# Patient Record
Sex: FEMALE | Race: WHITE | NOT HISPANIC OR LATINO
[De-identification: names, ages, dates, MRNs, and addresses within clinical notes are randomized per-mention and may not be internally consistent; named-entity substitution may affect disease eponyms.]

---

## 2017-01-11 ENCOUNTER — TRANSCRIPTION ENCOUNTER (OUTPATIENT)
Age: 42
End: 2017-01-11

## 2017-05-22 ENCOUNTER — RESULT REVIEW (OUTPATIENT)
Age: 42
End: 2017-05-22

## 2017-07-12 ENCOUNTER — RESULT REVIEW (OUTPATIENT)
Age: 42
End: 2017-07-12

## 2017-08-21 ENCOUNTER — TRANSCRIPTION ENCOUNTER (OUTPATIENT)
Age: 42
End: 2017-08-21

## 2018-10-09 ENCOUNTER — RESULT REVIEW (OUTPATIENT)
Age: 43
End: 2018-10-09

## 2018-11-17 ENCOUNTER — TRANSCRIPTION ENCOUNTER (OUTPATIENT)
Age: 43
End: 2018-11-17

## 2019-07-22 ENCOUNTER — TRANSCRIPTION ENCOUNTER (OUTPATIENT)
Age: 44
End: 2019-07-22

## 2019-10-30 ENCOUNTER — RESULT REVIEW (OUTPATIENT)
Age: 44
End: 2019-10-30

## 2020-01-08 ENCOUNTER — APPOINTMENT (OUTPATIENT)
Dept: COLORECTAL SURGERY | Facility: CLINIC | Age: 45
End: 2020-01-08
Payer: COMMERCIAL

## 2020-01-08 VITALS
HEIGHT: 64 IN | DIASTOLIC BLOOD PRESSURE: 60 MMHG | HEART RATE: 62 BPM | BODY MASS INDEX: 18.1 KG/M2 | RESPIRATION RATE: 12 BRPM | WEIGHT: 106 LBS | SYSTOLIC BLOOD PRESSURE: 100 MMHG

## 2020-01-08 DIAGNOSIS — K58.2 MIXED IRRITABLE BOWEL SYNDROME: ICD-10-CM

## 2020-01-08 DIAGNOSIS — Z12.11 ENCOUNTER FOR SCREENING FOR MALIGNANT NEOPLASM OF COLON: ICD-10-CM

## 2020-01-08 DIAGNOSIS — Z72.89 OTHER PROBLEMS RELATED TO LIFESTYLE: ICD-10-CM

## 2020-01-08 DIAGNOSIS — K61.0 ANAL ABSCESS: ICD-10-CM

## 2020-01-08 DIAGNOSIS — Z83.79 FAMILY HISTORY OF OTHER DISEASES OF THE DIGESTIVE SYSTEM: ICD-10-CM

## 2020-01-08 PROCEDURE — 99203 OFFICE O/P NEW LOW 30 MIN: CPT

## 2020-01-08 NOTE — PHYSICAL EXAM
[Normal Breath Sounds] : Normal breath sounds [Normal Heart Sounds] : normal heart sounds [No Edema] : No edema [Normal Rate and Rhythm] : normal rate and rhythm [Alert] : alert [Oriented to Person] : oriented to person [Oriented to Place] : oriented to place [Oriented to Time] : oriented to time [Calm] : calm [de-identified] : flat +BS NT/ND [de-identified] : NC/AT [de-identified] : +ROM [de-identified] : intact

## 2020-01-08 NOTE — REVIEW OF SYSTEMS
[As Noted in HPI] : as noted in HPI [Negative] : Endocrine [Shortness Of Breath] : no shortness of breath [Chest Pain] : no chest pain [Easy Bleeding] : no tendency for easy bleeding [Easy Bruising] : no tendency for easy bruising

## 2020-01-08 NOTE — HISTORY OF PRESENT ILLNESS
[FreeTextEntry1] : 43yo WFw/hx IBS.  S/P I&D of deep postanal space abscess in 10/2008 (no sequelae).\par \par Pt presents for screening colonoscopy.  Hx diarrhea alt w/constipation.  Occ blood on toilet tissue.  Wt stable.  Denies abdominal pain.  \par \par Father +hx colitis.

## 2020-01-13 ENCOUNTER — APPOINTMENT (OUTPATIENT)
Dept: COLORECTAL SURGERY | Facility: CLINIC | Age: 45
End: 2020-01-13
Payer: COMMERCIAL

## 2020-01-13 PROCEDURE — 45378 DIAGNOSTIC COLONOSCOPY: CPT

## 2020-11-03 ENCOUNTER — RESULT REVIEW (OUTPATIENT)
Age: 45
End: 2020-11-03

## 2020-12-23 PROBLEM — Z12.11 ENCOUNTER FOR SCREENING COLONOSCOPY: Status: RESOLVED | Noted: 2020-01-08 | Resolved: 2020-12-23

## 2021-02-23 ENCOUNTER — TRANSCRIPTION ENCOUNTER (OUTPATIENT)
Age: 46
End: 2021-02-23

## 2021-05-18 ENCOUNTER — APPOINTMENT (OUTPATIENT)
Dept: OBGYN | Facility: CLINIC | Age: 46
End: 2021-05-18
Payer: COMMERCIAL

## 2021-05-18 VITALS — SYSTOLIC BLOOD PRESSURE: 100 MMHG | DIASTOLIC BLOOD PRESSURE: 80 MMHG

## 2021-05-18 DIAGNOSIS — N64.4 MASTODYNIA: ICD-10-CM

## 2021-05-18 LAB — HCG UR QL: NEGATIVE

## 2021-05-18 PROCEDURE — 81025 URINE PREGNANCY TEST: CPT

## 2021-05-18 PROCEDURE — 99072 ADDL SUPL MATRL&STAF TM PHE: CPT

## 2021-05-18 PROCEDURE — 99214 OFFICE O/P EST MOD 30 MIN: CPT | Mod: 25

## 2021-06-08 ENCOUNTER — ASOB RESULT (OUTPATIENT)
Age: 46
End: 2021-06-08

## 2021-06-08 ENCOUNTER — APPOINTMENT (OUTPATIENT)
Dept: OBGYN | Facility: CLINIC | Age: 46
End: 2021-06-08
Payer: COMMERCIAL

## 2021-06-08 PROCEDURE — 99072 ADDL SUPL MATRL&STAF TM PHE: CPT

## 2021-06-08 PROCEDURE — 76830 TRANSVAGINAL US NON-OB: CPT

## 2021-07-08 ENCOUNTER — APPOINTMENT (OUTPATIENT)
Dept: OBGYN | Facility: CLINIC | Age: 46
End: 2021-07-08

## 2021-08-03 ENCOUNTER — APPOINTMENT (OUTPATIENT)
Dept: OBGYN | Facility: CLINIC | Age: 46
End: 2021-08-03

## 2021-08-30 ENCOUNTER — TRANSCRIPTION ENCOUNTER (OUTPATIENT)
Age: 46
End: 2021-08-30

## 2021-11-23 DIAGNOSIS — R92.2 INCONCLUSIVE MAMMOGRAM: ICD-10-CM

## 2022-01-05 ENCOUNTER — RESULT REVIEW (OUTPATIENT)
Age: 47
End: 2022-01-05

## 2022-01-05 ENCOUNTER — APPOINTMENT (OUTPATIENT)
Dept: MAMMOGRAPHY | Facility: CLINIC | Age: 47
End: 2022-01-05
Payer: COMMERCIAL

## 2022-01-05 ENCOUNTER — APPOINTMENT (OUTPATIENT)
Dept: ULTRASOUND IMAGING | Facility: CLINIC | Age: 47
End: 2022-01-05
Payer: COMMERCIAL

## 2022-01-05 PROCEDURE — 76641 ULTRASOUND BREAST COMPLETE: CPT | Mod: 50

## 2022-01-05 PROCEDURE — 77063 BREAST TOMOSYNTHESIS BI: CPT

## 2022-01-05 PROCEDURE — 77067 SCR MAMMO BI INCL CAD: CPT

## 2022-01-20 ENCOUNTER — APPOINTMENT (OUTPATIENT)
Dept: OBGYN | Facility: CLINIC | Age: 47
End: 2022-01-20
Payer: COMMERCIAL

## 2022-01-20 VITALS — SYSTOLIC BLOOD PRESSURE: 112 MMHG | DIASTOLIC BLOOD PRESSURE: 67 MMHG | HEIGHT: 64 IN

## 2022-01-20 DIAGNOSIS — N83.201 UNSPECIFIED OVARIAN CYST, RIGHT SIDE: ICD-10-CM

## 2022-01-20 DIAGNOSIS — N83.202 UNSPECIFIED OVARIAN CYST, RIGHT SIDE: ICD-10-CM

## 2022-01-20 DIAGNOSIS — Z00.00 ENCOUNTER FOR GENERAL ADULT MEDICAL EXAMINATION W/OUT ABNORMAL FINDINGS: ICD-10-CM

## 2022-01-20 PROCEDURE — 99396 PREV VISIT EST AGE 40-64: CPT

## 2022-01-20 PROCEDURE — 82270 OCCULT BLOOD FECES: CPT

## 2022-01-20 NOTE — PLAN
[FreeTextEntry1] : 46 year old female pt presents for routine gyn exam\par Breast and pelvic exam performed\par Pap/HPV conducted\par Advised pt to schedule pelvic sono \par Advised pt to schedule DEXA\par \par f/u with Derm\par RTO in 1 year or PRN\par

## 2022-01-20 NOTE — HISTORY OF PRESENT ILLNESS
[FreeTextEntry1] : 2022. CHASTITY VALENTINO 46 year old female  LMP . She presents for an annual gyn exam and offers no complaints. \par \par Periods Q1-6 months Denies breakthrough bleeding, vaginal discharge and vaginitis sxs. Denies abdominal or pelvic pain. She has normal BMs. Denies bloody stool and urinary complaints. She is currently sexually active. \par \par Denies FHx of breast, ovarian, uterine or colon cancer. \par No new medical conditions, medications or surgeries.\par \par PHx; Sarcoidosis \par SHx: Tonsillectomy \par Med: denies\par All: Sulfa \par \par \par \par  [TextBox_4] : Dec 2020 - cyst 4 cm right, 1.7 cm left \par June 2021 - 2.7 cm cyst right, left normal  [Mammogramdate] : 2021 [BreastSonogramDate] : 2021 [PapSmeardate] : 2020 [ColonoscopyDate] : 2020

## 2022-01-20 NOTE — COUNSELING
[Vitamins/Supplements] : vitamins/supplements [Breast Self Exam] : breast self exam [Confidentiality] : confidentiality

## 2022-01-20 NOTE — PHYSICAL EXAM

## 2022-01-23 LAB — HPV HIGH+LOW RISK DNA PNL CVX: NOT DETECTED

## 2022-01-25 LAB — CYTOLOGY CVX/VAG DOC THIN PREP: NORMAL

## 2022-10-17 ENCOUNTER — APPOINTMENT (OUTPATIENT)
Dept: OBGYN | Facility: CLINIC | Age: 47
End: 2022-10-17

## 2022-10-17 ENCOUNTER — ASOB RESULT (OUTPATIENT)
Age: 47
End: 2022-10-17

## 2022-10-17 VITALS
WEIGHT: 107 LBS | HEIGHT: 64 IN | SYSTOLIC BLOOD PRESSURE: 115 MMHG | BODY MASS INDEX: 18.27 KG/M2 | DIASTOLIC BLOOD PRESSURE: 73 MMHG

## 2022-10-17 LAB
APPEARANCE: CLEAR
BACTERIA: NEGATIVE
BILIRUBIN URINE: NEGATIVE
BLOOD URINE: NEGATIVE
COLOR: YELLOW
GLUCOSE QUALITATIVE U: NEGATIVE
HCG UR QL: NEGATIVE
HYALINE CASTS: 2 /LPF
KETONES URINE: NEGATIVE
LEUKOCYTE ESTERASE URINE: NEGATIVE
MICROSCOPIC-UA: NORMAL
NITRITE URINE: NEGATIVE
PH URINE: 6.5
PROTEIN URINE: NEGATIVE
RED BLOOD CELLS URINE: 1 /HPF
SPECIFIC GRAVITY URINE: 1.01
SQUAMOUS EPITHELIAL CELLS: 1 /HPF
UROBILINOGEN URINE: NORMAL
WHITE BLOOD CELLS URINE: 0 /HPF

## 2022-10-17 PROCEDURE — 99213 OFFICE O/P EST LOW 20 MIN: CPT

## 2022-10-17 PROCEDURE — 76830 TRANSVAGINAL US NON-OB: CPT

## 2022-10-17 RX ORDER — BETAMETHASONE DIPROPIONATE 0.5 MG/G
0.05 CREAM, AUGMENTED TOPICAL
Qty: 100 | Refills: 0 | Status: ACTIVE | COMMUNITY
Start: 2022-07-18

## 2022-10-18 ENCOUNTER — NON-APPOINTMENT (OUTPATIENT)
Age: 47
End: 2022-10-18

## 2022-10-18 LAB — BACTERIA UR CULT: NORMAL

## 2022-11-09 ENCOUNTER — APPOINTMENT (OUTPATIENT)
Dept: OBGYN | Facility: CLINIC | Age: 47
End: 2022-11-09

## 2022-11-11 ENCOUNTER — APPOINTMENT (OUTPATIENT)
Dept: OBGYN | Facility: CLINIC | Age: 47
End: 2022-11-11

## 2022-11-11 ENCOUNTER — ASOB RESULT (OUTPATIENT)
Age: 47
End: 2022-11-11

## 2022-11-11 PROCEDURE — 81025 URINE PREGNANCY TEST: CPT

## 2022-11-11 PROCEDURE — 76831 ECHO EXAM UTERUS: CPT

## 2022-11-11 PROCEDURE — 58340 CATHETER FOR HYSTEROGRAPHY: CPT

## 2022-11-14 LAB — HCG UR QL: NEGATIVE

## 2022-12-13 ENCOUNTER — NON-APPOINTMENT (OUTPATIENT)
Age: 47
End: 2022-12-13

## 2023-01-09 DIAGNOSIS — R92.2 INCONCLUSIVE MAMMOGRAM: ICD-10-CM

## 2023-01-09 DIAGNOSIS — Z12.39 ENCOUNTER FOR OTHER SCREENING FOR MALIGNANT NEOPLASM OF BREAST: ICD-10-CM

## 2023-01-11 ENCOUNTER — APPOINTMENT (OUTPATIENT)
Dept: MAMMOGRAPHY | Facility: CLINIC | Age: 48
End: 2023-01-11
Payer: COMMERCIAL

## 2023-01-11 ENCOUNTER — TRANSCRIPTION ENCOUNTER (OUTPATIENT)
Age: 48
End: 2023-01-11

## 2023-01-11 ENCOUNTER — RESULT REVIEW (OUTPATIENT)
Age: 48
End: 2023-01-11

## 2023-01-11 ENCOUNTER — APPOINTMENT (OUTPATIENT)
Dept: ULTRASOUND IMAGING | Facility: CLINIC | Age: 48
End: 2023-01-11
Payer: COMMERCIAL

## 2023-01-11 PROCEDURE — 76641 ULTRASOUND BREAST COMPLETE: CPT | Mod: 50

## 2023-01-11 PROCEDURE — 77063 BREAST TOMOSYNTHESIS BI: CPT

## 2023-01-11 PROCEDURE — 77067 SCR MAMMO BI INCL CAD: CPT

## 2023-01-19 ENCOUNTER — RESULT REVIEW (OUTPATIENT)
Age: 48
End: 2023-01-19

## 2023-01-19 ENCOUNTER — APPOINTMENT (OUTPATIENT)
Dept: MAMMOGRAPHY | Facility: CLINIC | Age: 48
End: 2023-01-19
Payer: COMMERCIAL

## 2023-01-19 ENCOUNTER — APPOINTMENT (OUTPATIENT)
Dept: ULTRASOUND IMAGING | Facility: CLINIC | Age: 48
End: 2023-01-19

## 2023-01-19 PROCEDURE — G0279: CPT | Mod: RT

## 2023-01-19 PROCEDURE — 77065 DX MAMMO INCL CAD UNI: CPT | Mod: RT

## 2023-01-23 ENCOUNTER — APPOINTMENT (OUTPATIENT)
Dept: OBGYN | Facility: CLINIC | Age: 48
End: 2023-01-23
Payer: COMMERCIAL

## 2023-01-23 VITALS
DIASTOLIC BLOOD PRESSURE: 74 MMHG | SYSTOLIC BLOOD PRESSURE: 120 MMHG | WEIGHT: 107 LBS | HEIGHT: 64 IN | BODY MASS INDEX: 18.27 KG/M2 | HEART RATE: 65 BPM

## 2023-01-23 PROCEDURE — 99396 PREV VISIT EST AGE 40-64: CPT

## 2023-01-23 PROCEDURE — 82270 OCCULT BLOOD FECES: CPT

## 2023-01-23 RX ORDER — DICLOFENAC SODIUM AND MISOPROSTOL 50; 200 MG/1; UG/1
50-0.2 TABLET, DELAYED RELEASE ORAL
Qty: 60 | Refills: 0 | Status: DISCONTINUED | COMMUNITY
Start: 2022-10-12 | End: 2023-01-23

## 2023-01-24 LAB — HPV HIGH+LOW RISK DNA PNL CVX: NOT DETECTED

## 2023-01-26 LAB — CYTOLOGY CVX/VAG DOC THIN PREP: NORMAL

## 2023-02-08 ENCOUNTER — APPOINTMENT (OUTPATIENT)
Dept: OBGYN | Facility: CLINIC | Age: 48
End: 2023-02-08
Payer: COMMERCIAL

## 2023-02-08 ENCOUNTER — ASOB RESULT (OUTPATIENT)
Age: 48
End: 2023-02-08

## 2023-02-08 DIAGNOSIS — N95.1 MENOPAUSAL AND FEMALE CLIMACTERIC STATES: ICD-10-CM

## 2023-02-08 DIAGNOSIS — N93.8 OTHER SPECIFIED ABNORMAL UTERINE AND VAGINAL BLEEDING: ICD-10-CM

## 2023-02-08 LAB
HCG UR QL: NEGATIVE
QUALITY CONTROL: YES

## 2023-02-08 PROCEDURE — 58340 CATHETER FOR HYSTEROGRAPHY: CPT

## 2023-02-08 PROCEDURE — 76831 ECHO EXAM UTERUS: CPT

## 2023-02-08 PROCEDURE — 81025 URINE PREGNANCY TEST: CPT

## 2023-02-08 PROCEDURE — 99212 OFFICE O/P EST SF 10 MIN: CPT | Mod: 25

## 2023-02-08 NOTE — PLAN
[FreeTextEntry1] : Sonohysterogram conducted for perimenopausal abnl bleeding\par SHG showed no polyps\par Sonohysterogram performed\par Pt tolerated procedure well and was given instructions prior to discharge\par \par  \par Menopausal sxs. D/w pt Relizen Samples given. Reviewed R/B/A.  Option sof HRT vs Effexor

## 2023-02-08 NOTE — PROCEDURE
[Abnormal Uterine Bleeding] : abnormal uterine bleeding [Saline Infusion Sonography] : saline infusion sonography [No Fibroid(s)] : no fibroid(s) [FreeTextEntry3] : 02/08/2023. CHASTITY VALENTINO 47 year year old female presents for a sonohysterogram due to perimenopausal abnl bleeding\par Discussed risk of bleeding or infection\par Under sterile technique\par A speculum was placed in the vagina and the Cervix was identified and prepped with [place]ne\par SHG catheter was threaded through the external os until endometrial location was felt\par The balloon was then inflated with .5 cc of saline\par The transvaginal probe was then inserted into the vagina by the tech\par The catheter and balloon was localized\par Saline was gently instilled while the imaging was performed\par The endometrial cavity was fully visualized\par Findings: no polyps noted\par Plan: Advised to use Relizen\par Pt tolerated procedure well and was given instructions prior to discharge  [FreeTextEntry4] : Findings: no polyps noted\par Plan: Advised to use Relizen

## 2023-07-10 ENCOUNTER — LABORATORY RESULT (OUTPATIENT)
Age: 48
End: 2023-07-10

## 2023-07-10 ENCOUNTER — APPOINTMENT (OUTPATIENT)
Dept: RHEUMATOLOGY | Facility: CLINIC | Age: 48
End: 2023-07-10
Payer: COMMERCIAL

## 2023-07-10 VITALS
HEART RATE: 56 BPM | HEIGHT: 64 IN | SYSTOLIC BLOOD PRESSURE: 117 MMHG | DIASTOLIC BLOOD PRESSURE: 73 MMHG | BODY MASS INDEX: 17.93 KG/M2 | WEIGHT: 105 LBS | OXYGEN SATURATION: 98 %

## 2023-07-10 DIAGNOSIS — L92.0 GRANULOMA ANNULARE: ICD-10-CM

## 2023-07-10 PROCEDURE — 99203 OFFICE O/P NEW LOW 30 MIN: CPT

## 2023-07-10 NOTE — ASSESSMENT
[FreeTextEntry1] : 47 year old female here for evaluation of possible autoimmune disease.\par \par 1 She has occasional arthralgias and dyspnea but noticing more skin lesions throughout her body that have previously been diagnosed as either sarcoid lesions or granuloma annulare. Initially seemed to have responded to Plaquenil but more recently tried topical therapy and intralesional steroid injections without much relief. Referred to dermatology (Dr. Zapata) for evaluation and labs ordered today to evaluate for underlying systemic autoimmune disease which I doubt at this time. \par \par Follow up as needed based on labs and dermatology evaluation.

## 2023-07-10 NOTE — PHYSICAL EXAM
[General Appearance - Alert] : alert [General Appearance - In No Acute Distress] : in no acute distress [Sclera] : the sclera and conjunctiva were normal [PERRL With Normal Accommodation] : pupils were equal in size, round, and reactive to light [Extraocular Movements] : extraocular movements were intact [Outer Ear] : the ears and nose were normal in appearance [Oropharynx] : the oropharynx was normal [Neck Appearance] : the appearance of the neck was normal [Neck Cervical Mass (___cm)] : no neck mass was observed [Jugular Venous Distention Increased] : there was no jugular-venous distention [Thyroid Diffuse Enlargement] : the thyroid was not enlarged [Thyroid Nodule] : there were no palpable thyroid nodules [] : no respiratory distress [Auscultation Breath Sounds / Voice Sounds] : lungs were clear to auscultation bilaterally [Heart Rate And Rhythm] : heart rate was normal and rhythm regular [Heart Sounds] : normal S1 and S2 [Heart Sounds Gallop] : no gallops [Murmurs] : no murmurs [Heart Sounds Pericardial Friction Rub] : no pericardial rub [Full Pulse] : the pedal pulses are present [Edema] : there was no peripheral edema [Cervical Lymph Nodes Enlarged Posterior Bilaterally] : posterior cervical [Cervical Lymph Nodes Enlarged Anterior Bilaterally] : anterior cervical [Supraclavicular Lymph Nodes Enlarged Bilaterally] : supraclavicular [Axillary Lymph Nodes Enlarged Bilaterally] : axillary [No CVA Tenderness] : no ~M costovertebral angle tenderness [No Spinal Tenderness] : no spinal tenderness [Nail Clubbing] : no clubbing  or cyanosis of the fingernails [Abnormal Walk] : normal gait [Musculoskeletal - Swelling] : no joint swelling seen [Motor Tone] : muscle strength and tone were normal [FreeTextEntry1] : scattered maculopapular rashes over the limbs and torso [No Focal Deficits] : no focal deficits [Oriented To Time, Place, And Person] : oriented to person, place, and time [Impaired Insight] : insight and judgment were intact [Affect] : the affect was normal

## 2023-07-10 NOTE — REVIEW OF SYSTEMS
[Arthralgias] : arthralgias [Joint Pain] : no joint pain [Joint Swelling] : no joint swelling [Joint Stiffness] : no joint stiffness [Limb Pain] : no limb pain [Limb Swelling] : no limb swelling [As Noted in HPI] : as noted in HPI [Skin Lesions] : skin lesion [Negative] : Heme/Lymph

## 2023-07-10 NOTE — HISTORY OF PRESENT ILLNESS
[Currently Experiencing] : currently [Fatigue] : fatigue [Skin Lesions] : skin lesions [Arthralgias] : arthralgias [Dyspnea] : dyspnea [FreeTextEntry1] : 47 year old female here for evaluation of autoimmune condition\par \par Years ago she had a small lesion on her shin which was diagnosed as sarcoid lesion. She had an extensive evaluation for other organ manifestations which was negative. Was started on Plaquenil for a couple years which resolved her lesions. 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 THis time she was not treated with any medications. Tried topical treatments which did not help. Has been getting intralesional injections which is not working. Thinks that it has spread to all over her body since she got the COVID vaccine. Not itchy or painful but bothers her cosmetically. \par \par She is a dancer and always exercising so does notice occasional arthralgia and myalgias. Feels that her breathing is a bit compromised lately. \par \par Denies any constitutional symptoms - fevers, chills, malaise, weight loss, myalgias. No h/o rashes, uveitis, hair loss, headaches, mucosal ulcers, Raynaud's, sicca symptoms, GI or  issues, serositis, pleuritis, pericarditis, weakness or paresthesias. No h/o blood clots.\par \par A0 - healthy pregnancies. [Anorexia] : no anorexia [Weight Loss] : no weight loss [Malaise] : no malaise [Fever] : no fever [Chills] : no chills [Depression] : no depression [Malar Facial Rash] : no malar facial rash [Skin Nodules] : no skin nodules [Oral Ulcers] : no oral ulcers [Cough] : no cough [Dry Mouth] : no dry mouth [Dysphonia] : no dysphonia [Dysphagia] : no dysphagia [Shortness of Breath] : no shortness of breath [Chest Pain] : no chest pain [Joint Swelling] : no joint swelling [Joint Warmth] : no joint warmth [Joint Deformity] : no joint deformity [Decreased ROM] : no decreased range of motion [Morning Stiffness] : no morning stiffness [Falls] : no falls [Difficulty Standing] : no difficulty standing [Difficulty Walking] : no difficulty walking [Myalgias] : no myalgias [Muscle Weakness] : no muscle weakness [Muscle Spasms] : no muscle spasms [Muscle Cramping] : no muscle cramping [Visual Changes] : no visual changes [Eye Pain] : no eye pain [Eye Redness] : no eye redness [Dry Eyes] : no dry eyes

## 2023-07-14 LAB
24R-OH-CALCIDIOL SERPL-MCNC: 92 PG/ML
25(OH)D3 SERPL-MCNC: 37.2 NG/ML
ACE BLD-CCNC: 54 U/L
ALBUMIN MFR SERPL ELPH: 64.8 %
ALBUMIN SERPL ELPH-MCNC: 4.5 G/DL
ALBUMIN SERPL-MCNC: 4.3 G/DL
ALBUMIN/GLOB SERPL: 1.8 RATIO
ALP BLD-CCNC: 60 U/L
ALPHA1 GLOB MFR SERPL ELPH: 3.5 %
ALPHA1 GLOB SERPL ELPH-MCNC: 0.2 G/DL
ALPHA2 GLOB MFR SERPL ELPH: 7.2 %
ALPHA2 GLOB SERPL ELPH-MCNC: 0.5 G/DL
ALT SERPL-CCNC: 15 U/L
ANA SER IF-ACNC: NEGATIVE
ANION GAP SERPL CALC-SCNC: 12 MMOL/L
APPEARANCE: CLEAR
AST SERPL-CCNC: 21 U/L
B-GLOBULIN MFR SERPL ELPH: 9.1 %
B-GLOBULIN SERPL ELPH-MCNC: 0.6 G/DL
BILIRUB SERPL-MCNC: 0.7 MG/DL
BILIRUBIN URINE: NEGATIVE
BLOOD URINE: ABNORMAL
BUN SERPL-MCNC: 14 MG/DL
C3 SERPL-MCNC: 77 MG/DL
C4 SERPL-MCNC: 19 MG/DL
CALCIUM SERPL-MCNC: 9.7 MG/DL
CCP AB SER IA-ACNC: <8 UNITS
CHLORIDE SERPL-SCNC: 103 MMOL/L
CK SERPL-CCNC: 69 U/L
CO2 SERPL-SCNC: 25 MMOL/L
COLOR: YELLOW
CREAT SERPL-MCNC: 0.71 MG/DL
CRP SERPL-MCNC: <3 MG/L
DEPRECATED KAPPA LC FREE/LAMBDA SER: 1.27 RATIO
DSDNA AB SER-ACNC: <12 IU/ML
EGFR: 104 ML/MIN/1.73M2
ENA RNP AB SER IA-ACNC: <0.2 AL
ENA SM AB SER IA-ACNC: <0.2 AL
ENA SS-A AB SER IA-ACNC: <0.2 AL
ENA SS-B AB SER IA-ACNC: <0.2 AL
ERYTHROCYTE [SEDIMENTATION RATE] IN BLOOD BY WESTERGREN METHOD: 2 MM/HR
GAMMA GLOB FLD ELPH-MCNC: 1 G/DL
GAMMA GLOB MFR SERPL ELPH: 15.4 %
GLUCOSE QUALITATIVE U: NEGATIVE MG/DL
GLUCOSE SERPL-MCNC: 92 MG/DL
IGA SER QL IEP: 138 MG/DL
IGG SER QL IEP: 912 MG/DL
IGM SER QL IEP: 165 MG/DL
INTERPRETATION SERPL IEP-IMP: NORMAL
KAPPA LC CSF-MCNC: 1.38 MG/DL
KAPPA LC SERPL-MCNC: 1.75 MG/DL
KETONES URINE: NEGATIVE MG/DL
LEUKOCYTE ESTERASE URINE: NEGATIVE
NITRITE URINE: NEGATIVE
PH URINE: 7
POTASSIUM SERPL-SCNC: 4.2 MMOL/L
PROT SERPL-MCNC: 6.7 G/DL
PROTEIN URINE: NEGATIVE MG/DL
RF+CCP IGG SER-IMP: NEGATIVE
RHEUMATOID FACT SER QL: <10 IU/ML
SODIUM SERPL-SCNC: 140 MMOL/L
SPECIFIC GRAVITY URINE: 1.01
THYROGLOB AB SERPL-ACNC: <20 IU/ML
THYROPEROXIDASE AB SERPL IA-ACNC: 21 IU/ML
UROBILINOGEN URINE: 0.2 MG/DL

## 2023-07-17 LAB
ALBUPE: 17.7 %
ALPHA1UPE: 27.7 %
ALPHA2UPE: 21.6 %
BENCE JONES EXCRETION: 0 MG/24HR
BETAUPE: 12.3 %
CREAT 24H UR-MCNC: 0.6 G/24 H
CREATININE UR (MAYO): 57 MG/DL
GAMMAUPE: 20.7 %
IGA 24H UR QL IFE: NORMAL
KAPPA LC 24H UR QL: 0 MG/DL
M PROTEIN 24H MFR UR ELPH: 0 %
PROT ?TM UR-MCNC: 24 HR
PROT PATTERN 24H UR ELPH-IMP: NORMAL
PROT UR-MCNC: 5 MG/DL
PROT UR-MCNC: 5 MG/DL
SPECIMEN VOL 24H UR: 1075 ML
U PROTEIN QNT CALCULATION: 54 MG/24 H

## 2023-07-25 ENCOUNTER — APPOINTMENT (OUTPATIENT)
Dept: DERMATOLOGY | Facility: CLINIC | Age: 48
End: 2023-07-25
Payer: COMMERCIAL

## 2023-07-25 VITALS — HEIGHT: 64 IN | WEIGHT: 106 LBS | BODY MASS INDEX: 18.1 KG/M2

## 2023-07-25 PROCEDURE — 99204 OFFICE O/P NEW MOD 45 MIN: CPT

## 2023-07-26 ENCOUNTER — NON-APPOINTMENT (OUTPATIENT)
Age: 48
End: 2023-07-26

## 2023-07-30 NOTE — ASSESSMENT
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 Patient provided with contact information.  - After biopsy report received, plan to re-start Hydroxychloroquine 200 mg BID PO daily.  - Risks/benefits/alternatives of Plaquenil discussed. Discussed that it takes at least 3 months results to be seen.  - We have discussed the possible adverse effects of this medication, including but not limited to GI upset, low blood counts, inflammation of the liver, retinopathies, and hypersensitivity reaction.  - We have discussed that alcohol consumption must be eliminated while on this medication. we have discussed that pregnancy is non-viable result in significant fetal abnormality while on this medication and 3 months after completing course, and that the Patient must take adequate measures to eliminate risk of conceiving or participation in conceiving while on this medication. - Patient expressed understanding of these risks.  #high risk med mgmt (HCQ) - optho exam for baseline test

## 2023-07-30 NOTE — HISTORY OF PRESENT ILLNESS
[FreeTextEntry1] : NPA- rash [de-identified] : 47yoF presenting today for evaluation of a rash that started at least 10 years ago. Asx. Previously just on extremities but spread to trunk. History of reported previously been diagnosed as either sarcoid lesions or granuloma annulare. Initially seemed to have responded to Plaquenil x 2 years, but more recently tried UV therapy, topical therapy and intralesional steroid injections with (previously saw Dr. Osman Davis in [place]) without much relief. However, she does report relief with topical Opzelura overnight under occlusion. Previously saw Dr. Holden Muhammad in [place]. \par Saw Rheumatology (Dr. Woodard)- C3 slightly low at 77- but all labs WNL. \par Last skin bx was before COVID.\par \par Denies personal or family history of skin cancer. \par \par

## 2023-07-30 NOTE — PHYSICAL EXAM
[Full Body Skin Exam Performed] : performed [FreeTextEntry3] : General:\par well appearing person in nad, alert, pleasant;\par Skin:\par \par Face clear. \par Pink and white shiny granulomatous papules on the trunk and extremities, with one papule at left postauricular scalp. Several papules coalescing into plaques on the right upper arm and left upper back\par Hyperpigmented and hypopigmented macules and papules on bilateral lower extremities\par \par Unable to visualize nails d/t polish.\par No periungual hemorrhages.

## 2023-08-10 ENCOUNTER — APPOINTMENT (OUTPATIENT)
Dept: RADIOLOGY | Facility: IMAGING CENTER | Age: 48
End: 2023-08-10
Payer: COMMERCIAL

## 2023-08-10 ENCOUNTER — OUTPATIENT (OUTPATIENT)
Dept: OUTPATIENT SERVICES | Facility: HOSPITAL | Age: 48
LOS: 1 days | End: 2023-08-10
Payer: COMMERCIAL

## 2023-08-10 DIAGNOSIS — L30.9 DERMATITIS, UNSPECIFIED: ICD-10-CM

## 2023-08-10 PROCEDURE — 71046 X-RAY EXAM CHEST 2 VIEWS: CPT | Mod: 26

## 2023-08-10 PROCEDURE — 71046 X-RAY EXAM CHEST 2 VIEWS: CPT

## 2023-08-11 ENCOUNTER — NON-APPOINTMENT (OUTPATIENT)
Age: 48
End: 2023-08-11

## 2023-09-11 ENCOUNTER — APPOINTMENT (OUTPATIENT)
Dept: OBGYN | Facility: CLINIC | Age: 48
End: 2023-09-11
Payer: COMMERCIAL

## 2023-09-11 ENCOUNTER — ASOB RESULT (OUTPATIENT)
Age: 48
End: 2023-09-11

## 2023-09-11 VITALS — SYSTOLIC BLOOD PRESSURE: 129 MMHG | BODY MASS INDEX: 18.2 KG/M2 | DIASTOLIC BLOOD PRESSURE: 80 MMHG | WEIGHT: 106 LBS

## 2023-09-11 DIAGNOSIS — R10.31 RIGHT LOWER QUADRANT PAIN: ICD-10-CM

## 2023-09-11 PROCEDURE — 76830 TRANSVAGINAL US NON-OB: CPT

## 2023-09-11 PROCEDURE — 99212 OFFICE O/P EST SF 10 MIN: CPT

## 2023-09-12 LAB
C TRACH RRNA SPEC QL NAA+PROBE: NOT DETECTED
N GONORRHOEA RRNA SPEC QL NAA+PROBE: NOT DETECTED
SOURCE AMPLIFICATION: NORMAL

## 2023-09-30 ENCOUNTER — NON-APPOINTMENT (OUTPATIENT)
Age: 48
End: 2023-09-30

## 2023-10-06 ENCOUNTER — NON-APPOINTMENT (OUTPATIENT)
Age: 48
End: 2023-10-06

## 2023-10-07 ENCOUNTER — EMERGENCY (EMERGENCY)
Facility: HOSPITAL | Age: 48
LOS: 1 days | Discharge: HOME | End: 2023-10-07
Attending: EMERGENCY MEDICINE
Payer: COMMERCIAL

## 2023-10-07 VITALS
DIASTOLIC BLOOD PRESSURE: 96 MMHG | TEMPERATURE: 99 F | WEIGHT: 106.04 LBS | SYSTOLIC BLOOD PRESSURE: 176 MMHG | OXYGEN SATURATION: 98 % | HEART RATE: 107 BPM | HEIGHT: 64 IN | RESPIRATION RATE: 18 BRPM

## 2023-10-07 PROCEDURE — 99285 EMERGENCY DEPT VISIT HI MDM: CPT

## 2023-10-07 NOTE — ED ADULT TRIAGE NOTE - CHIEF COMPLAINT QUOTE
complaining of generalized shaking and shortness of breath after taking herbs from an herbalist x 10pm for URI symptoms.   Hx granuloma annulare complaining of generalized shaking and shortness of breath after taking herbs from an herbalist x 10pm for URI symptoms.   Hx granuloma annulare- on plaquenil

## 2023-10-08 VITALS — SYSTOLIC BLOOD PRESSURE: 101 MMHG | DIASTOLIC BLOOD PRESSURE: 69 MMHG | HEART RATE: 85 BPM

## 2023-10-08 LAB
ALBUMIN SERPL ELPH-MCNC: 4.4 G/DL — SIGNIFICANT CHANGE UP (ref 3.3–5)
ALP SERPL-CCNC: 68 U/L — SIGNIFICANT CHANGE UP (ref 40–120)
ALT FLD-CCNC: 20 U/L — SIGNIFICANT CHANGE UP (ref 10–45)
ANION GAP SERPL CALC-SCNC: 14 MMOL/L — SIGNIFICANT CHANGE UP (ref 5–17)
APPEARANCE UR: CLEAR — SIGNIFICANT CHANGE UP
APTT BLD: 28.2 SEC — SIGNIFICANT CHANGE UP (ref 24.5–35.6)
AST SERPL-CCNC: 20 U/L — SIGNIFICANT CHANGE UP (ref 10–40)
BASE EXCESS BLDV CALC-SCNC: 4.3 MMOL/L — HIGH (ref -2–3)
BASOPHILS # BLD AUTO: 0.03 K/UL — SIGNIFICANT CHANGE UP (ref 0–0.2)
BASOPHILS NFR BLD AUTO: 0.3 % — SIGNIFICANT CHANGE UP (ref 0–2)
BILIRUB SERPL-MCNC: 0.6 MG/DL — SIGNIFICANT CHANGE UP (ref 0.2–1.2)
BILIRUB UR-MCNC: NEGATIVE — SIGNIFICANT CHANGE UP
BUN SERPL-MCNC: 16 MG/DL — SIGNIFICANT CHANGE UP (ref 7–23)
CA-I SERPL-SCNC: 1.18 MMOL/L — SIGNIFICANT CHANGE UP (ref 1.15–1.33)
CALCIUM SERPL-MCNC: 9.7 MG/DL — SIGNIFICANT CHANGE UP (ref 8.4–10.5)
CHLORIDE BLDV-SCNC: 101 MMOL/L — SIGNIFICANT CHANGE UP (ref 96–108)
CHLORIDE SERPL-SCNC: 99 MMOL/L — SIGNIFICANT CHANGE UP (ref 96–108)
CO2 BLDV-SCNC: 28 MMOL/L — HIGH (ref 22–26)
CO2 SERPL-SCNC: 24 MMOL/L — SIGNIFICANT CHANGE UP (ref 22–31)
COLOR SPEC: COLORLESS — SIGNIFICANT CHANGE UP
CREAT SERPL-MCNC: 0.84 MG/DL — SIGNIFICANT CHANGE UP (ref 0.5–1.3)
DIFF PNL FLD: NEGATIVE — SIGNIFICANT CHANGE UP
EGFR: 85 ML/MIN/1.73M2 — SIGNIFICANT CHANGE UP
EOSINOPHIL # BLD AUTO: 0.02 K/UL — SIGNIFICANT CHANGE UP (ref 0–0.5)
EOSINOPHIL NFR BLD AUTO: 0.2 % — SIGNIFICANT CHANGE UP (ref 0–6)
GAS PNL BLDV: 134 MMOL/L — LOW (ref 136–145)
GAS PNL BLDV: SIGNIFICANT CHANGE UP
GAS PNL BLDV: SIGNIFICANT CHANGE UP
GLUCOSE BLDV-MCNC: 123 MG/DL — HIGH (ref 70–99)
GLUCOSE SERPL-MCNC: 121 MG/DL — HIGH (ref 70–99)
GLUCOSE UR QL: NEGATIVE — SIGNIFICANT CHANGE UP
HCO3 BLDV-SCNC: 27 MMOL/L — SIGNIFICANT CHANGE UP (ref 22–29)
HCT VFR BLD CALC: 37.1 % — SIGNIFICANT CHANGE UP (ref 34.5–45)
HCT VFR BLDA CALC: 40 % — SIGNIFICANT CHANGE UP (ref 34.5–46.5)
HGB BLD CALC-MCNC: 13.4 G/DL — SIGNIFICANT CHANGE UP (ref 11.7–16.1)
HGB BLD-MCNC: 13.2 G/DL — SIGNIFICANT CHANGE UP (ref 11.5–15.5)
HIV 1+2 AB+HIV1 P24 AG SERPL QL IA: SIGNIFICANT CHANGE UP
HOROWITZ INDEX BLDV+IHG-RTO: SIGNIFICANT CHANGE UP
IMM GRANULOCYTES NFR BLD AUTO: 0.3 % — SIGNIFICANT CHANGE UP (ref 0–0.9)
INR BLD: 1.18 RATIO — SIGNIFICANT CHANGE UP (ref 0.85–1.18)
KETONES UR-MCNC: NEGATIVE — SIGNIFICANT CHANGE UP
LACTATE BLDV-MCNC: 1.7 MMOL/L — SIGNIFICANT CHANGE UP (ref 0.5–2)
LEUKOCYTE ESTERASE UR-ACNC: NEGATIVE — SIGNIFICANT CHANGE UP
LYMPHOCYTES # BLD AUTO: 0.29 K/UL — LOW (ref 1–3.3)
LYMPHOCYTES # BLD AUTO: 2.6 % — LOW (ref 13–44)
MCHC RBC-ENTMCNC: 30.8 PG — SIGNIFICANT CHANGE UP (ref 27–34)
MCHC RBC-ENTMCNC: 35.6 GM/DL — SIGNIFICANT CHANGE UP (ref 32–36)
MCV RBC AUTO: 86.5 FL — SIGNIFICANT CHANGE UP (ref 80–100)
MONOCYTES # BLD AUTO: 0.21 K/UL — SIGNIFICANT CHANGE UP (ref 0–0.9)
MONOCYTES NFR BLD AUTO: 1.9 % — LOW (ref 2–14)
NEUTROPHILS # BLD AUTO: 10.64 K/UL — HIGH (ref 1.8–7.4)
NEUTROPHILS NFR BLD AUTO: 94.7 % — HIGH (ref 43–77)
NITRITE UR-MCNC: NEGATIVE — SIGNIFICANT CHANGE UP
NRBC # BLD: 0 /100 WBCS — SIGNIFICANT CHANGE UP (ref 0–0)
PCO2 BLDV: 34 MMHG — LOW (ref 39–42)
PH BLDV: 7.51 — HIGH (ref 7.32–7.43)
PH UR: 5.5 — SIGNIFICANT CHANGE UP (ref 5–8)
PLATELET # BLD AUTO: 147 K/UL — LOW (ref 150–400)
PO2 BLDV: 43 MMHG — SIGNIFICANT CHANGE UP (ref 25–45)
POTASSIUM BLDV-SCNC: 3.4 MMOL/L — LOW (ref 3.5–5.1)
POTASSIUM SERPL-MCNC: 3.5 MMOL/L — SIGNIFICANT CHANGE UP (ref 3.5–5.3)
POTASSIUM SERPL-SCNC: 3.5 MMOL/L — SIGNIFICANT CHANGE UP (ref 3.5–5.3)
PROT SERPL-MCNC: 6.8 G/DL — SIGNIFICANT CHANGE UP (ref 6–8.3)
PROT UR-MCNC: NEGATIVE — SIGNIFICANT CHANGE UP
PROTHROM AB SERPL-ACNC: 12.9 SEC — SIGNIFICANT CHANGE UP (ref 9.5–13)
RAPID RVP RESULT: DETECTED
RBC # BLD: 4.29 M/UL — SIGNIFICANT CHANGE UP (ref 3.8–5.2)
RBC # FLD: 12.3 % — SIGNIFICANT CHANGE UP (ref 10.3–14.5)
RSV RNA SPEC QL NAA+PROBE: DETECTED
SAO2 % BLDV: 81.2 % — SIGNIFICANT CHANGE UP (ref 67–88)
SARS-COV-2 RNA SPEC QL NAA+PROBE: SIGNIFICANT CHANGE UP
SODIUM SERPL-SCNC: 137 MMOL/L — SIGNIFICANT CHANGE UP (ref 135–145)
SP GR SPEC: 1 — LOW (ref 1.01–1.02)
UROBILINOGEN FLD QL: NEGATIVE — SIGNIFICANT CHANGE UP
WBC # BLD: 11.22 K/UL — HIGH (ref 3.8–10.5)
WBC # FLD AUTO: 11.22 K/UL — HIGH (ref 3.8–10.5)

## 2023-10-08 PROCEDURE — 82330 ASSAY OF CALCIUM: CPT

## 2023-10-08 PROCEDURE — 84132 ASSAY OF SERUM POTASSIUM: CPT

## 2023-10-08 PROCEDURE — 85014 HEMATOCRIT: CPT

## 2023-10-08 PROCEDURE — 74177 CT ABD & PELVIS W/CONTRAST: CPT | Mod: 26,MA

## 2023-10-08 PROCEDURE — 82947 ASSAY GLUCOSE BLOOD QUANT: CPT

## 2023-10-08 PROCEDURE — 71046 X-RAY EXAM CHEST 2 VIEWS: CPT

## 2023-10-08 PROCEDURE — 96374 THER/PROPH/DIAG INJ IV PUSH: CPT | Mod: XU

## 2023-10-08 PROCEDURE — 85018 HEMOGLOBIN: CPT

## 2023-10-08 PROCEDURE — 87389 HIV-1 AG W/HIV-1&-2 AB AG IA: CPT

## 2023-10-08 PROCEDURE — 81003 URINALYSIS AUTO W/O SCOPE: CPT

## 2023-10-08 PROCEDURE — 36415 COLL VENOUS BLD VENIPUNCTURE: CPT

## 2023-10-08 PROCEDURE — 99285 EMERGENCY DEPT VISIT HI MDM: CPT | Mod: 25

## 2023-10-08 PROCEDURE — 85610 PROTHROMBIN TIME: CPT

## 2023-10-08 PROCEDURE — 82803 BLOOD GASES ANY COMBINATION: CPT

## 2023-10-08 PROCEDURE — 87086 URINE CULTURE/COLONY COUNT: CPT

## 2023-10-08 PROCEDURE — 84295 ASSAY OF SERUM SODIUM: CPT

## 2023-10-08 PROCEDURE — 74177 CT ABD & PELVIS W/CONTRAST: CPT | Mod: MA

## 2023-10-08 PROCEDURE — 87040 BLOOD CULTURE FOR BACTERIA: CPT

## 2023-10-08 PROCEDURE — 83690 ASSAY OF LIPASE: CPT

## 2023-10-08 PROCEDURE — 0225U NFCT DS DNA&RNA 21 SARSCOV2: CPT

## 2023-10-08 PROCEDURE — 80053 COMPREHEN METABOLIC PANEL: CPT

## 2023-10-08 PROCEDURE — 85025 COMPLETE CBC W/AUTO DIFF WBC: CPT

## 2023-10-08 PROCEDURE — 71275 CT ANGIOGRAPHY CHEST: CPT | Mod: MA

## 2023-10-08 PROCEDURE — 84484 ASSAY OF TROPONIN QUANT: CPT

## 2023-10-08 PROCEDURE — 83605 ASSAY OF LACTIC ACID: CPT

## 2023-10-08 PROCEDURE — 82435 ASSAY OF BLOOD CHLORIDE: CPT

## 2023-10-08 PROCEDURE — 93005 ELECTROCARDIOGRAM TRACING: CPT

## 2023-10-08 PROCEDURE — 71046 X-RAY EXAM CHEST 2 VIEWS: CPT | Mod: 26

## 2023-10-08 PROCEDURE — 85730 THROMBOPLASTIN TIME PARTIAL: CPT

## 2023-10-08 PROCEDURE — 71275 CT ANGIOGRAPHY CHEST: CPT | Mod: 26,MA

## 2023-10-08 RX ORDER — ACETAMINOPHEN 500 MG
725 TABLET ORAL ONCE
Refills: 0 | Status: COMPLETED | OUTPATIENT
Start: 2023-10-08 | End: 2023-10-08

## 2023-10-08 RX ORDER — SODIUM CHLORIDE 9 MG/ML
1000 INJECTION INTRAMUSCULAR; INTRAVENOUS; SUBCUTANEOUS ONCE
Refills: 0 | Status: COMPLETED | OUTPATIENT
Start: 2023-10-08 | End: 2023-10-08

## 2023-10-08 RX ORDER — AZITHROMYCIN 500 MG/1
1 TABLET, FILM COATED ORAL
Qty: 7 | Refills: 0
Start: 2023-10-08 | End: 2023-10-14

## 2023-10-08 RX ORDER — IBUPROFEN 200 MG
600 TABLET ORAL ONCE
Refills: 0 | Status: COMPLETED | OUTPATIENT
Start: 2023-10-08 | End: 2023-10-08

## 2023-10-08 RX ORDER — AZITHROMYCIN 500 MG/1
500 TABLET, FILM COATED ORAL ONCE
Refills: 0 | Status: COMPLETED | OUTPATIENT
Start: 2023-10-08 | End: 2023-10-08

## 2023-10-08 RX ADMIN — Medication 1 TABLET(S): at 06:27

## 2023-10-08 RX ADMIN — Medication 600 MILLIGRAM(S): at 01:44

## 2023-10-08 RX ADMIN — Medication 290 MILLIGRAM(S): at 05:09

## 2023-10-08 RX ADMIN — Medication 600 MILLIGRAM(S): at 04:45

## 2023-10-08 RX ADMIN — AZITHROMYCIN 500 MILLIGRAM(S): 500 TABLET, FILM COATED ORAL at 06:27

## 2023-10-08 RX ADMIN — SODIUM CHLORIDE 1000 MILLILITER(S): 9 INJECTION INTRAMUSCULAR; INTRAVENOUS; SUBCUTANEOUS at 01:44

## 2023-10-08 NOTE — ED PROVIDER NOTE - OBJECTIVE STATEMENT
47 yo F w pmhx granuloma annulare presents to ED c/o chest pain for 4 weeks, URI congestion for 2 weeks, and acute tremors and fevers since this evening after taking herbal medications. Pt states that she has been having chest pain for 4 weeks and has been worked up by PCP with stress test, cardiac enzymes all returning negative. 2 weeks ago, pt started developing URI congestion, worse when she inhales, saw PCP and was given doxycycline for potential bronchitis. Pt has not taken doxycycline, and saw herbalist who prescribed vitamin supplements which included multiple herb extracts including shiitake mushroom, goldenseal roots, garlic bulb, willow bark, cordyceps extract and multiple other extracts. States she took 2 pills at 3pm, and 2 pills at 10pm.  Pt takes tesslon perles. Admits to fevers, chills, abdominal pain, denies nausea, vomiting, dizziness,  SOB, dysuria, hematuria. 47 yo F w pmhx granuloma annulare presents to ED c/o chest pain for 4 weeks, URI congestion for 2 weeks, and acute tremors and fevers since this evening after taking herbal medications. Pt states that she has been having chest pain for 4 weeks and has been worked up by PCP with stress test, cardiac enzymes all returning negative. 2 weeks ago, pt started developing URI congestion, worse when she inhales, saw PCP and was given doxycycline for potential bronchitis. Pt has not taken doxycycline, and saw herbalist who prescribed vitamin supplements which included multiple herb extracts including shiitake mushroom, goldenseal roots, garlic bulb, willow bark, cordyceps extract and multiple other extracts. States she took 2 pills at 3pm, and 2 pills at 10pm.  Pt takes tesslon perles. Admits to fevers, chills, abdominal pain, denies nausea, vomiting, dizziness,  SOB, dysuria, hematuria.    Attendinyo female presents with shaking rigors and URI symptoms.  feels a pressure in the chest.  had a stress in the past 2-3 weeks which was normal.  no vomiting.  now with right sided abdominal discomfort.

## 2023-10-08 NOTE — ED PROVIDER NOTE - CLINICAL SUMMARY MEDICAL DECISION MAKING FREE TEXT BOX
47 yo F w pmhx granuloma annulare presents to ED c/o chest pain for 4 weeks, URI congestion for 2 weeks, and acute tremors and fevers since this evening after taking herbal medications. Pt states that she has been having chest pain for 4 weeks and has been worked up by PCP with stress test, cardiac enzymes all returning negative. 2 weeks ago, pt started developing URI congestion, worse when she inhales, saw PCP and was given doxycycline for potential bronchitis. Pt has not taken doxycycline, and saw herbalist who prescribed vitamin supplements which included multiple herb extracts including shiitake mushroom, goldenseal roots, garlic bulb, willow bark, cordyceps extract and multiple other extracts. States she took 2 pills at 3pm, and 2 pills at 10pm. Pt takes tesslon perles Admits to fevers, chills, abdominal pain, denies nausea, vomiting, dizziness,  SOB, dysuria, hematuria. Febrile to 102, tachy to 107. Physical exam remarkable for absent diaphoresis, absent myoclonus, SARA. Warm to touch. Clinical suspicion for diverticulitis vs. URI 2/2 viral infection vs. bronchitis vs. pna vs pe vs. ingestion    - sepsis workup, cbc, cmp, vbg, fluids, ua, cxr, bxr  - ct angio to r/o pe  - ct abd/pelvis divertic

## 2023-10-08 NOTE — ED ADULT NURSE NOTE - NSFALLUNIVINTERV_ED_ALL_ED
Bed/Stretcher in lowest position, wheels locked, appropriate side rails in place/Call bell, personal items and telephone in reach/Instruct patient to call for assistance before getting out of bed/chair/stretcher/Non-slip footwear applied when patient is off stretcher/[place] to call system/Physically safe environment - no spills, clutter or unnecessary equipment/Purposeful proactive rounding/Room/bathroom lighting operational, light cord in reach

## 2023-10-08 NOTE — ED PROVIDER NOTE - PATIENT PORTAL LINK FT
You can access the FollowMyHealth Patient Portal offered by [facility] by registering at the following website: [email or web address]. By joining [employer]’s FollowMyHealth portal, you will also be able to view your health information using other applications (apps) compatible with our system.

## 2023-10-08 NOTE — ED ADULT NURSE NOTE - CHIEF COMPLAINT QUOTE
complaining of generalized shaking and shortness of breath after taking herbs from an herbalist x 10pm for URI symptoms.   Hx granuloma annulare- on plaquenil pacemaker: good capture, regular rhythm and appropriate intervals.

## 2023-10-08 NOTE — ED PROVIDER NOTE - PHYSICAL EXAMINATION
Gen: AAOx3, non-toxic  Head: NCAT  HEENT: EOMI, SARA, oral mucosa moist, normal conjunctiva  Lung: CTAB, no respiratory distress, no wheezes/rhonchi/rales B/L,   CV: RRR, no murmurs, rubs or gallops  Abd: soft, epigastric and RUQ ttp, no guarding, no CVA tenderness  MSK: no visible deformities. no myoclonus  Neuro: No focal sensory or motor deficits  Skin: Warm, well perfused, no rash  Psych: normal affect.

## 2023-10-08 NOTE — ED PROVIDER NOTE - NSFOLLOWUPINSTRUCTIONS_ED_ALL_ED_FT
You were seen in the ED for upper respiratory symptoms, congestion, and fever. You were evaluated in the ED with bloodwork and a CT scan which showed presence of a pneumonia. You were given antibiotics here in the ED, and given a prescription to your pharmacy. You can take 1tablet of augmentin 875/125mg twice a day, for 7 days, and 1tablet of azithromycin 250mg once a day. Please followup with your rheumatologist for further management for your autoimmune condition, as sarcoidosis was not ruled out on the CT. Please followup with your PCP for further management of your pneumonia. Please bring this documentation to your PCP. You may return to the ED if your symptoms worsen, your fever persists, you develop worsening shortness of breath, tremors, chills.     Pneumonia    Pneumonia is an infection of the lungs. Pneumonia may be caused by bacteria, viruses, or funguses. Symptoms include coughing, fever, chest pain when breathing deeply or coughing, shortness of breath, fatigue, or muscle aches. Pneumonia can be diagnosed with a medical history and physical exam, as well as other tests which may include a chest X-ray. If you were prescribed an antibiotic medicine, take it as told by your health care provider and do not stop taking the antibiotic even if you start to feel better. Do not use tobacco products, including cigarettes, chewing tobacco, and e-cigarettes.    SEEK IMMEDIATE MEDICAL CARE IF YOU HAVE ANY OF THE FOLLOWING SYMPTOMS: worsening shortness of breath, worsening chest pain, coughing up blood, change in mental status, lightheadedness/dizziness.

## 2023-10-08 NOTE — ED ADULT NURSE NOTE - OBJECTIVE STATEMENT
47 yo female presents to the ED AAOx4 ambulatory with complaints of abd pain, URI symptoms, SOB, fever / chills x multiple days. Break coverage RN. Pt is a 47yo f coming from home c/o fever, chills, SOB, abd pain, chest pain. Pt states that earlier this evening she was having chest pain, productive cough with green mucus and "felt a wheezing" in her chest when she breathed. PT states that she also had an "episode of uncontrollable shaking" this evening and came to Ed, when pt arrived to Ed had a fever of 102. Pt states that approx 6 weeks ago was told that she has COVID and has been having symptoms since, but has worsened, went to herbalist this AM and prescribed meds, but did not want to take d/t autoimmune disorder. PMH of COVID, autoimmune disorder. PT A,Ox4, ambulatory at baseline, pt placed on cardiac monitor, in NSR, EKG performed in ED. Respirations even and unlabored, productive cough noted, abd soft, nondistended and nontender, skin warm, dry and intact, [place]. Denies n/v/d,  and urinary symptoms. Stretcher locked in lowest position, appropriate side rails up for safety, pt instructed to call for RN if anything needed.

## 2023-10-09 ENCOUNTER — NON-APPOINTMENT (OUTPATIENT)
Age: 48
End: 2023-10-09

## 2023-10-09 NOTE — ED POST DISCHARGE NOTE - ADDITIONAL DOCUMENTATION
from above... Would likely change pt to doxycycline. Pt appreciative of call, will reach out to primary ED team prior to changing abx to doxy...

## 2023-10-09 NOTE — ED POST DISCHARGE NOTE - DETAILS
10/9/23: results d/w pt, advised outpatient f/u for rpt chest imaging, pt follows outpatient for possible sarcoidosis vs granuloma annulare. -Andrew Bowie PA-C 10/9/23: d/w the primary ED team, ok with changing azith to doxy. called pt back at preferred number (primary # in chart) no answer, lvm with call back number, Rx for Doxy sent to pharmacy with instructions to DC azithromycin. -Andrew Bowie PA-C 10/9/23: results d/w pt, advised outpatient f/u for rpt chest imaging, pt follows outpatient for possible sarcoidosis vs granuloma annulare. Pt also notes she is currently on Plaquenil and inquiring about possible drug interactions. Chart reviewed, ECG from yesterday QTc 536, I advised pt to DC azithromycin for now and speak to her outpatient provider about plaquenil, pt informed of QTc result to relay to her outpt provider (coadministration macrolid and plaquenil can in QT.., see below

## 2023-10-10 ENCOUNTER — NON-APPOINTMENT (OUTPATIENT)
Age: 48
End: 2023-10-10

## 2023-10-10 LAB
CULTURE RESULTS: SIGNIFICANT CHANGE UP
SPECIMEN SOURCE: SIGNIFICANT CHANGE UP

## 2023-10-13 LAB
CULTURE RESULTS: SIGNIFICANT CHANGE UP
CULTURE RESULTS: SIGNIFICANT CHANGE UP
SPECIMEN SOURCE: SIGNIFICANT CHANGE UP
SPECIMEN SOURCE: SIGNIFICANT CHANGE UP

## 2023-10-24 ENCOUNTER — APPOINTMENT (OUTPATIENT)
Dept: PULMONOLOGY | Facility: CLINIC | Age: 48
End: 2023-10-24
Payer: COMMERCIAL

## 2023-10-24 VITALS
DIASTOLIC BLOOD PRESSURE: 73 MMHG | RESPIRATION RATE: 16 BRPM | OXYGEN SATURATION: 98 % | SYSTOLIC BLOOD PRESSURE: 111 MMHG | HEART RATE: 65 BPM

## 2023-10-24 DIAGNOSIS — J18.9 PNEUMONIA, UNSPECIFIED ORGANISM: ICD-10-CM

## 2023-10-24 PROCEDURE — 99205 OFFICE O/P NEW HI 60 MIN: CPT | Mod: 25

## 2023-10-24 PROCEDURE — 94726 PLETHYSMOGRAPHY LUNG VOLUMES: CPT

## 2023-10-24 PROCEDURE — 94010 BREATHING CAPACITY TEST: CPT

## 2023-10-24 PROCEDURE — 71046 X-RAY EXAM CHEST 2 VIEWS: CPT

## 2023-10-24 PROCEDURE — 94729 DIFFUSING CAPACITY: CPT

## 2023-10-27 PROBLEM — J18.9 PNEUMONIA DUE TO INFECTIOUS ORGANISM, UNSPECIFIED LATERALITY, UNSPECIFIED PART OF LUNG: Status: ACTIVE | Noted: 2023-10-27

## 2023-11-13 ENCOUNTER — APPOINTMENT (OUTPATIENT)
Dept: DERMATOLOGY | Facility: CLINIC | Age: 48
End: 2023-11-13
Payer: COMMERCIAL

## 2023-11-13 DIAGNOSIS — L30.9 DERMATITIS, UNSPECIFIED: ICD-10-CM

## 2023-11-13 PROCEDURE — 99214 OFFICE O/P EST MOD 30 MIN: CPT

## 2023-11-13 RX ORDER — HYDROXYCHLOROQUINE SULFATE 200 MG/1
200 TABLET, FILM COATED ORAL TWICE DAILY
Qty: 60 | Refills: 2 | Status: ACTIVE | COMMUNITY
Start: 2023-08-15 | End: 1900-01-01

## 2023-12-06 ENCOUNTER — APPOINTMENT (OUTPATIENT)
Dept: RHEUMATOLOGY | Facility: CLINIC | Age: 48
End: 2023-12-06
Payer: COMMERCIAL

## 2023-12-06 VITALS
WEIGHT: 106 LBS | HEIGHT: 64 IN | HEART RATE: 81 BPM | BODY MASS INDEX: 18.1 KG/M2 | OXYGEN SATURATION: 98 % | SYSTOLIC BLOOD PRESSURE: 112 MMHG | DIASTOLIC BLOOD PRESSURE: 70 MMHG

## 2023-12-06 DIAGNOSIS — L92.9 GRANULOMATOUS DISORDER OF THE SKIN AND SUBCUTANEOUS TISSUE, UNSPECIFIED: ICD-10-CM

## 2023-12-06 DIAGNOSIS — D22.9 MELANOCYTIC NEVI, UNSPECIFIED: ICD-10-CM

## 2023-12-06 DIAGNOSIS — Z91.89 OTHER SPECIFIED PERSONAL RISK FACTORS, NOT ELSEWHERE CLASSIFIED: ICD-10-CM

## 2023-12-06 DIAGNOSIS — Z80.3 FAMILY HISTORY OF MALIGNANT NEOPLASM OF BREAST: ICD-10-CM

## 2023-12-06 PROCEDURE — 99214 OFFICE O/P EST MOD 30 MIN: CPT

## 2023-12-12 ENCOUNTER — OUTPATIENT (OUTPATIENT)
Dept: OUTPATIENT SERVICES | Facility: HOSPITAL | Age: 48
LOS: 1 days | End: 2023-12-12
Payer: COMMERCIAL

## 2023-12-12 ENCOUNTER — APPOINTMENT (OUTPATIENT)
Dept: CT IMAGING | Facility: IMAGING CENTER | Age: 48
End: 2023-12-12
Payer: COMMERCIAL

## 2023-12-12 DIAGNOSIS — R59.0 LOCALIZED ENLARGED LYMPH NODES: ICD-10-CM

## 2023-12-12 PROCEDURE — 71260 CT THORAX DX C+: CPT | Mod: 26

## 2023-12-12 PROCEDURE — 71260 CT THORAX DX C+: CPT

## 2023-12-18 ENCOUNTER — APPOINTMENT (OUTPATIENT)
Dept: PULMONOLOGY | Facility: CLINIC | Age: 48
End: 2023-12-18
Payer: COMMERCIAL

## 2023-12-18 VITALS — SYSTOLIC BLOOD PRESSURE: 109 MMHG | HEART RATE: 64 BPM | OXYGEN SATURATION: 95 % | DIASTOLIC BLOOD PRESSURE: 68 MMHG

## 2023-12-18 PROCEDURE — 99214 OFFICE O/P EST MOD 30 MIN: CPT

## 2023-12-19 NOTE — PROCEDURE
[FreeTextEntry1] : 10/24/2023 Pulmonary function testing FEV1, FVC, and FEV1/FVC are within normal limits. TLC and subdivisions are normal. RV/TLC ratio is normal. Resistance and specific conductance are normal. Single breath diffusion capacity is normal.   1 / 3 Devin Kee  Report date:10/8/2023 View Order (Report matches exam selected in Patient History pane)     ACC: [id number] EXAM: CT ABDOMEN AND PELVIS IC ORDERED BY: YEN MONTAÑO  ACC: [id number] EXAM: CT ANGIO CHEST PULM ART WAWIC ORDERED BY: YEN MONTAÑO  PROCEDURE DATE: 10/08/2023    INTERPRETATION: CLINICAL INFORMATION: Chest pain. Shortness of breath. Abdominal pain.  COMPARISON: None.  CONTRAST/COMPLICATIONS: IV Contrast: IV contrast documented in unlinked concurrent exam (accession [id number]), Omnipaque 350 (accession [id number]) 90 cc administered 10 cc discarded Oral Contrast: NONE Complications: None reported at time of study completion  PROCEDURE: CT Angiography of the Chest was performed followed by portal venous phase imaging of the Abdomen and Pelvis. Sagittal and coronal reformats were performed as well as 3D (MIP) reconstructions.  FINDINGS: CHEST: LUNGS AND LARGE AIRWAYS: Patent central airways. Left lower lobe consolidation. Lungs otherwise clear. PLEURA: No pleural effusion. VESSELS: No pulmonary embolus. No thoracic aortic dissection or aneurysm. HEART: Heart size is normal. No pericardial effusion. MEDIASTINUM AND ZHANE: Moderate subcarinal and bilateral hilar lymphadenopathy. CHEST WALL AND LOWER NECK: Within normal limits.  ABDOMEN AND PELVIS: LIVER: Within normal limits. BILE DUCTS: Normal caliber. GALLBLADDER: Within normal limits. SPLEEN: Within normal limits. PANCREAS: Within normal limits. ADRENALS: Within normal limits. KIDNEYS/URETERS: Within normal limits.  BLADDER: Within normal limits. REPRODUCTIVE ORGANS: Uterus and adnexa within normal limits.  BOWEL: No bowel obstruction. Appendix not identified, no ancillary findings appendicitis. PERITONEUM: No ascites. VESSELS: Within normal limits. RETROPERITONEUM/LYMPH NODES: No lymphadenopathy. ABDOMINAL WALL: Within normal limits. BONES: Within normal limits.  IMPRESSION: No evidence of pulmonary embolism.  Left lower lobe pneumonia.  Moderate subcarinal and bilateral hilar lymphadenopathy presumably reactive/infectious related to the pneumonia. Sarcoidosis, lymphoma less likely but possible.  Recommend follow-up of both of these findings to resolution to exclude malignancy.  No acute findings in the abdomen or pelvis.  --- End of Report ---     ROBERT MILLS MD; Resident Radiologist This document has been electronically signed. DEVIN KEE MD; Attending Radiologist This document has been electronically signed. Oct 8 2023 5:45AM

## 2023-12-19 NOTE — ASSESSMENT
[FreeTextEntry1] : CT 3 months from prior.  Task sent as reminder. Sooner PRN Derm f/u.  To see sarcoid dermatology specialist. Considering methotrexate or Humira via rheumatology.  Wishes to first see if more prolonged course of Plaquenil is beneficial. Decision on need for further intervention or biopsy after follow-up CAT scan.  35-minute spent evaluation management review of multiple studies and discussion.

## 2023-12-19 NOTE — PHYSICAL EXAM
[No Acute Distress] : no acute distress [Normal Oropharynx] : normal oropharynx [Normal Appearance] : normal appearance [No Neck Mass] : no neck mass [Normal Rate/Rhythm] : normal rate/rhythm [Normal S1, S2] : normal s1, s2 [No Murmurs] : no murmurs [No Resp Distress] : no resp distress [Clear to Auscultation Bilaterally] : clear to auscultation bilaterally [No Abnormalities] : no abnormalities [Benign] : benign [Normal Gait] : normal gait [No Clubbing] : no clubbing [No Cyanosis] : no cyanosis [No Edema] : no edema [FROM] : FROM [Normal Color/ Pigmentation] : normal color/ pigmentation [No Focal Deficits] : no focal deficits [Oriented x3] : oriented x3 [Normal Affect] : normal affect [TextBox_125] : Multiple cutaneous lesions.

## 2023-12-19 NOTE — CONSULT LETTER
[Dear  ___] : Dear ~DELVIS, [Consult Letter:] : I had the pleasure of evaluating your patient, [unfilled]. [Consult Closing:] : Thank you very much for allowing me to participate in the care of this patient.  If you have any questions, please do not hesitate to contact me. [DrTanika  ___] : Dr. CARLISLE [DrTanika ___] : Dr. CARLISLE [FreeTextEntry2] : Daniele Sampson MD [FreeTextEntry3] : [facility]

## 2023-12-19 NOTE — HISTORY OF PRESENT ILLNESS
[Former] : former [TextBox_4] : Here for ct results had 12/12/23. saw immunologist today. Saw rheum and recc switching to methotrexate or Humira.  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  Progressive skin lesions. back on Plaquenil 2 months Works out regularly.  Denies significant cough wheezing chest pain or shortness of breath.  Good exercise tolerance. Labor Day weekend had COVID. Seeing dermatology rheumatology and immunology. Has to get follow-up CT in 3 months. Has seen ophtho.  Positive developed skin lesions 10 years ago.  Had biopsy and came back as sarcoid. Sent for optho and cxr told negative treated with plaquenil for 1 year.  Resolved. 4 years later developed leg lesions which persisted and slow growing told not sarcoid.  After COVID noted increase in skin lesions.  Saw derm and put back on Plaquenil but stopped.      [TextBox_11] : 1/2-1 [TextBox_13] : 15 [YearQuit] : 2000

## 2023-12-19 NOTE — DISCUSSION/SUMMARY
[FreeTextEntry1] : S/P pneumonia clinically and radiographically resolved. Status post RSV infection. Skin lesions sarcoid.  Progressive Bilateral hilar and mediastinal adenopathy more likely related to sarcoidosis than acute infection.  Still present but decreased.  Possible component related to prior infection. Pulmonary nodules may be related to sarcoid. Patient is clinically and functionally well at this time.  Significantly improved. Progressive skin lesions.

## 2024-01-11 ENCOUNTER — RESULT REVIEW (OUTPATIENT)
Age: 49
End: 2024-01-11

## 2024-01-11 ENCOUNTER — APPOINTMENT (OUTPATIENT)
Dept: MAMMOGRAPHY | Facility: CLINIC | Age: 49
End: 2024-01-11
Payer: COMMERCIAL

## 2024-01-11 ENCOUNTER — APPOINTMENT (OUTPATIENT)
Dept: ULTRASOUND IMAGING | Facility: CLINIC | Age: 49
End: 2024-01-11
Payer: COMMERCIAL

## 2024-01-11 PROCEDURE — 77067 SCR MAMMO BI INCL CAD: CPT

## 2024-01-11 PROCEDURE — 77063 BREAST TOMOSYNTHESIS BI: CPT

## 2024-01-11 PROCEDURE — 76641 ULTRASOUND BREAST COMPLETE: CPT | Mod: 50

## 2024-01-25 ENCOUNTER — NON-APPOINTMENT (OUTPATIENT)
Age: 49
End: 2024-01-25

## 2024-01-25 ENCOUNTER — APPOINTMENT (OUTPATIENT)
Dept: DERMATOLOGY | Facility: CLINIC | Age: 49
End: 2024-01-25

## 2024-02-11 ENCOUNTER — NON-APPOINTMENT (OUTPATIENT)
Age: 49
End: 2024-02-11

## 2024-02-26 ENCOUNTER — APPOINTMENT (OUTPATIENT)
Dept: RHEUMATOLOGY | Facility: CLINIC | Age: 49
End: 2024-02-26

## 2024-02-29 ENCOUNTER — APPOINTMENT (OUTPATIENT)
Dept: HEPATOLOGY | Facility: CLINIC | Age: 49
End: 2024-02-29

## 2024-03-11 ENCOUNTER — APPOINTMENT (OUTPATIENT)
Dept: CT IMAGING | Facility: IMAGING CENTER | Age: 49
End: 2024-03-11
Payer: COMMERCIAL

## 2024-03-11 ENCOUNTER — OUTPATIENT (OUTPATIENT)
Dept: OUTPATIENT SERVICES | Facility: HOSPITAL | Age: 49
LOS: 1 days | End: 2024-03-11
Payer: COMMERCIAL

## 2024-03-11 DIAGNOSIS — R59.0 LOCALIZED ENLARGED LYMPH NODES: ICD-10-CM

## 2024-03-11 PROCEDURE — 71260 CT THORAX DX C+: CPT

## 2024-03-11 PROCEDURE — 71260 CT THORAX DX C+: CPT | Mod: 26

## 2024-03-13 ENCOUNTER — NON-APPOINTMENT (OUTPATIENT)
Age: 49
End: 2024-03-13

## 2024-03-14 ENCOUNTER — NON-APPOINTMENT (OUTPATIENT)
Age: 49
End: 2024-03-14

## 2024-03-15 ENCOUNTER — APPOINTMENT (OUTPATIENT)
Dept: ULTRASOUND IMAGING | Facility: CLINIC | Age: 49
End: 2024-03-15
Payer: COMMERCIAL

## 2024-03-15 ENCOUNTER — OUTPATIENT (OUTPATIENT)
Dept: OUTPATIENT SERVICES | Facility: HOSPITAL | Age: 49
LOS: 1 days | End: 2024-03-15
Payer: COMMERCIAL

## 2024-03-15 DIAGNOSIS — Z00.8 ENCOUNTER FOR OTHER GENERAL EXAMINATION: ICD-10-CM

## 2024-03-15 PROCEDURE — 76856 US EXAM PELVIC COMPLETE: CPT

## 2024-03-15 PROCEDURE — 76856 US EXAM PELVIC COMPLETE: CPT | Mod: 26

## 2024-03-18 ENCOUNTER — APPOINTMENT (OUTPATIENT)
Dept: PULMONOLOGY | Facility: CLINIC | Age: 49
End: 2024-03-18
Payer: COMMERCIAL

## 2024-03-18 VITALS
SYSTOLIC BLOOD PRESSURE: 119 MMHG | WEIGHT: 107 LBS | BODY MASS INDEX: 18.37 KG/M2 | DIASTOLIC BLOOD PRESSURE: 80 MMHG | HEART RATE: 62 BPM | OXYGEN SATURATION: 100 %

## 2024-03-18 DIAGNOSIS — R91.8 OTHER NONSPECIFIC ABNORMAL FINDING OF LUNG FIELD: ICD-10-CM

## 2024-03-18 DIAGNOSIS — R59.0 LOCALIZED ENLARGED LYMPH NODES: ICD-10-CM

## 2024-03-18 LAB — POCT - HEMOGLOBIN (HGB), QUANTITATIVE, TRANSCUTANEOUS: 12.6

## 2024-03-18 PROCEDURE — 94729 DIFFUSING CAPACITY: CPT

## 2024-03-18 PROCEDURE — 88738 HGB QUANT TRANSCUTANEOUS: CPT

## 2024-03-18 PROCEDURE — ZZZZZ: CPT

## 2024-03-18 PROCEDURE — 94010 BREATHING CAPACITY TEST: CPT

## 2024-03-18 PROCEDURE — 94727 GAS DIL/WSHOT DETER LNG VOL: CPT

## 2024-03-18 PROCEDURE — 99214 OFFICE O/P EST MOD 30 MIN: CPT | Mod: 25

## 2024-03-18 NOTE — DISCUSSION/SUMMARY
[FreeTextEntry1] : S/P pneumonia clinically and radiographically resolved. Status post RSV infection. Skin lesions sarcoid.  Progressive Bilateral hilar and mediastinal adenopathy likely related to sarcoidosis.  Radiographically stable. Pulmonary nodules may be related to sarcoid.  Stable. Persistent skin lesions.  Despite Plaquenil.

## 2024-03-18 NOTE — CONSULT LETTER
[Dear  ___] : Dear ~DELVIS, [Consult Letter:] : I had the pleasure of evaluating your patient, [unfilled]. [Consult Closing:] : Thank you very much for allowing me to participate in the care of this patient.  If you have any questions, please do not hesitate to contact me. [DrTanika  ___] : Dr. CARLISLE [DrTanika ___] : Dr. CARLISLE [FreeTextEntry3] : [facility] [FreeTextEntry2] : Daniele Sampson MD

## 2024-03-18 NOTE — HISTORY OF PRESENT ILLNESS
[Former] : former [TextBox_4] : had  ct chest 3/11/24 Did second biopsy of skin and told sarcoid. On Plaquenil. No response. Recc. Humira for skin involvement.  Attempting to get approval.  No issues with resp. status. Some increase in urination.  Had U/A  saw immunologist . Saw rheum and recc switching to methotrexate or Humira.  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  Progressive skin lesions. back on Plaquenil 2 months Works out regularly.  Denies significant cough wheezing chest pain or shortness of breath.  Good exercise tolerance. Labor Day weekend had COVID. Seeing dermatology rheumatology and immunology. Has to get follow-up CT in 3 months. Has seen ophtho.  Positive developed skin lesions 10 years ago.  Had biopsy and came back as sarcoid. Sent for optho and cxr told negative treated with plaquenil for 1 year.  Resolved. 4 years later developed leg lesions which persisted and slow growing told not sarcoid.  After COVID noted increase in skin lesions.  Saw derm and put back on Plaquenil but stopped.      [TextBox_11] : 1/2-1 [TextBox_13] : 15 [YearQuit] : 2000

## 2024-03-18 NOTE — PHYSICAL EXAM
[No Acute Distress] : no acute distress [Normal Oropharynx] : normal oropharynx [Normal Appearance] : normal appearance [No Neck Mass] : no neck mass [Normal Rate/Rhythm] : normal rate/rhythm [Normal S1, S2] : normal s1, s2 [No Resp Distress] : no resp distress [No Murmurs] : no murmurs [Clear to Auscultation Bilaterally] : clear to auscultation bilaterally [No Abnormalities] : no abnormalities [Benign] : benign [Normal Gait] : normal gait [No Cyanosis] : no cyanosis [No Clubbing] : no clubbing [No Edema] : no edema [FROM] : FROM [No Focal Deficits] : no focal deficits [Normal Color/ Pigmentation] : normal color/ pigmentation [Oriented x3] : oriented x3 [Normal Affect] : normal affect [Normal to Percussion] : normal to percussion [TextBox_125] : Multiple cutaneous lesions.

## 2024-03-18 NOTE — PROCEDURE
[FreeTextEntry1] : 03/18/2024 Pulmonary function testing FEV1, FVC, and FEV1/FVC are within normal limits. TLC and subdivisions are normal. RV/TLC ratio is normal. Single breath diffusion capacity is normal.  PFT attached relatively stable function.  1 / 3 Devin Kee  Report date:10/8/2023 View Order (Report matches exam selected in Patient History pane)     ACC: [id number] EXAM: CT ABDOMEN AND PELVIS IC ORDERED BY: YEN MONTAÑO  ACC: [id number] EXAM: CT ANGIO CHEST PULM ART WAWIC ORDERED BY: YEN MONTAÑO  PROCEDURE DATE: 10/08/2023    INTERPRETATION: CLINICAL INFORMATION: Chest pain. Shortness of breath. Abdominal pain.  COMPARISON: None.  CONTRAST/COMPLICATIONS: IV Contrast: IV contrast documented in unlinked concurrent exam (accession [id number]), Omnipaque 350 (accession [id number]) 90 cc administered 10 cc discarded Oral Contrast: NONE Complications: None reported at time of study completion  PROCEDURE: CT Angiography of the Chest was performed followed by portal venous phase imaging of the Abdomen and Pelvis. Sagittal and coronal reformats were performed as well as 3D (MIP) reconstructions.  FINDINGS: CHEST: LUNGS AND LARGE AIRWAYS: Patent central airways. Left lower lobe consolidation. Lungs otherwise clear. PLEURA: No pleural effusion. VESSELS: No pulmonary embolus. No thoracic aortic dissection or aneurysm. HEART: Heart size is normal. No pericardial effusion. MEDIASTINUM AND ZHANE: Moderate subcarinal and bilateral hilar lymphadenopathy. CHEST WALL AND LOWER NECK: Within normal limits.  ABDOMEN AND PELVIS: LIVER: Within normal limits. BILE DUCTS: Normal caliber. GALLBLADDER: Within normal limits. SPLEEN: Within normal limits. PANCREAS: Within normal limits. ADRENALS: Within normal limits. KIDNEYS/URETERS: Within normal limits.  BLADDER: Within normal limits. REPRODUCTIVE ORGANS: Uterus and adnexa within normal limits.  BOWEL: No bowel obstruction. Appendix not identified, no ancillary findings appendicitis. PERITONEUM: No ascites. VESSELS: Within normal limits. RETROPERITONEUM/LYMPH NODES: No lymphadenopathy. ABDOMINAL WALL: Within normal limits. BONES: Within normal limits.  IMPRESSION: No evidence of pulmonary embolism.  Left lower lobe pneumonia.  Moderate subcarinal and bilateral hilar lymphadenopathy presumably reactive/infectious related to the pneumonia. Sarcoidosis, lymphoma less likely but possible.  Recommend follow-up of both of these findings to resolution to exclude malignancy.  No acute findings in the abdomen or pelvis.  --- End of Report ---     ROBERT MILLS MD; Resident Radiologist This document has been electronically signed. DEVIN KEE MD; Attending Radiologist This document has been electronically signed. Oct 8 2023 5:45AM

## 2024-03-18 NOTE — ASSESSMENT
[FreeTextEntry1] : Seeing dermatology.  Being evaluated for treatment with Humira.  For skin lesions. Follow-up in 6 months and decision on need for follow-up CT and further management. Follow-up sooner on a as needed basis. Labs sent. All discussed with patient. 35-minute spent evaluation management review of multiple studies and discussion.

## 2024-03-19 LAB
ALBUMIN SERPL ELPH-MCNC: 4.8 G/DL
ALP BLD-CCNC: 66 U/L
ALT SERPL-CCNC: 24 U/L
ANION GAP SERPL CALC-SCNC: 12 MMOL/L
AST SERPL-CCNC: 21 U/L
BASOPHILS # BLD AUTO: 0.11 K/UL
BASOPHILS NFR BLD AUTO: 1.6 %
BILIRUB SERPL-MCNC: 0.6 MG/DL
BUN SERPL-MCNC: 17 MG/DL
CALCIUM SERPL-MCNC: 10.1 MG/DL
CHLORIDE SERPL-SCNC: 102 MMOL/L
CO2 SERPL-SCNC: 27 MMOL/L
CREAT SERPL-MCNC: 0.72 MG/DL
EGFR: 102 ML/MIN/1.73M2
EOSINOPHIL # BLD AUTO: 0.07 K/UL
EOSINOPHIL NFR BLD AUTO: 1 %
GLUCOSE SERPL-MCNC: 91 MG/DL
HCT VFR BLD CALC: 41.2 %
HGB BLD-MCNC: 14.1 G/DL
IMM GRANULOCYTES NFR BLD AUTO: 0.4 %
LYMPHOCYTES # BLD AUTO: 1.01 K/UL
LYMPHOCYTES NFR BLD AUTO: 14.5 %
MAN DIFF?: NORMAL
MCHC RBC-ENTMCNC: 31.5 PG
MCHC RBC-ENTMCNC: 34.2 GM/DL
MCV RBC AUTO: 92 FL
MONOCYTES # BLD AUTO: 0.55 K/UL
MONOCYTES NFR BLD AUTO: 7.9 %
NEUTROPHILS # BLD AUTO: 5.18 K/UL
NEUTROPHILS NFR BLD AUTO: 74.6 %
PLATELET # BLD AUTO: 205 K/UL
POTASSIUM SERPL-SCNC: 4.4 MMOL/L
PROT SERPL-MCNC: 6.9 G/DL
RBC # BLD: 4.48 M/UL
RBC # FLD: 13.1 %
SODIUM SERPL-SCNC: 142 MMOL/L
WBC # FLD AUTO: 6.95 K/UL

## 2024-05-09 ENCOUNTER — NON-APPOINTMENT (OUTPATIENT)
Age: 49
End: 2024-05-09

## 2024-05-13 ENCOUNTER — NON-APPOINTMENT (OUTPATIENT)
Age: 49
End: 2024-05-13

## 2024-05-14 ENCOUNTER — APPOINTMENT (OUTPATIENT)
Dept: PULMONOLOGY | Facility: CLINIC | Age: 49
End: 2024-05-14
Payer: COMMERCIAL

## 2024-05-14 VITALS
DIASTOLIC BLOOD PRESSURE: 71 MMHG | HEART RATE: 69 BPM | SYSTOLIC BLOOD PRESSURE: 110 MMHG | RESPIRATION RATE: 16 BRPM | OXYGEN SATURATION: 98 %

## 2024-05-14 DIAGNOSIS — R05.1 ACUTE COUGH: ICD-10-CM

## 2024-05-14 DIAGNOSIS — J20.9 ACUTE BRONCHITIS, UNSPECIFIED: ICD-10-CM

## 2024-05-14 DIAGNOSIS — D86.9 SARCOIDOSIS, UNSPECIFIED: ICD-10-CM

## 2024-05-14 PROCEDURE — 71046 X-RAY EXAM CHEST 2 VIEWS: CPT

## 2024-05-14 PROCEDURE — 99214 OFFICE O/P EST MOD 30 MIN: CPT

## 2024-05-14 RX ORDER — AMOXICILLIN AND CLAVULANATE POTASSIUM 875; 125 MG/1; MG/1
875-125 TABLET, COATED ORAL
Qty: 14 | Refills: 0 | Status: ACTIVE | COMMUNITY
Start: 2024-05-14 | End: 1900-01-01

## 2024-05-14 NOTE — DISCUSSION/SUMMARY
[FreeTextEntry1] : Tracheobronchitis. S/P pneumonia clinically and radiographically resolved. Skin lesions sarcoid.  Responsive to Humira Bilateral hilar and mediastinal adenopathy likely related to sarcoidosis.  Radiographically stable. Pulmonary nodules may be related to sarcoid.  Stable.

## 2024-05-14 NOTE — PHYSICAL EXAM
[No Acute Distress] : no acute distress [Normal Oropharynx] : normal oropharynx [Normal Appearance] : normal appearance [No Neck Mass] : no neck mass [Normal Rate/Rhythm] : normal rate/rhythm [Normal S1, S2] : normal s1, s2 [No Murmurs] : no murmurs [No Resp Distress] : no resp distress [Clear to Auscultation Bilaterally] : clear to auscultation bilaterally [Normal to Percussion] : normal to percussion [No Abnormalities] : no abnormalities [Benign] : benign [Normal Gait] : normal gait [No Clubbing] : no clubbing [No Cyanosis] : no cyanosis [No Edema] : no edema [FROM] : FROM [Normal Color/ Pigmentation] : normal color/ pigmentation [No Focal Deficits] : no focal deficits [Oriented x3] : oriented x3 [Normal Affect] : normal affect [TextBox_125] : Multiple cutaneous lesions.

## 2024-05-14 NOTE — HISTORY OF PRESENT ILLNESS
[Former] : former [TextBox_4] : Called to come into office today started Friday night with sore throat followed by no voice and Monday night started to cough, heavy chest and thick green mucus just recently started Humira April 15th and has had 3 shots Dr Rosenbach at [facility] a sarcoid dermatologist with help in rash no other chest pains no fever had  ct chest 3/11/24 Did second biopsy of skin and told sarcoid. Off Plaquenil         Positive developed skin lesions 10 years ago.  Had biopsy and came back as sarcoid. Sent for optho and cxr told negative treated with plaquenil for 1 year.  Resolved. 4 years later developed leg lesions which persisted and slow growing told not sarcoid.  After COVID noted increase in skin lesions.  Saw derm and put back on Plaquenil but stopped.      [TextBox_11] : 1/2-1 [TextBox_13] : 15 [YearQuit] : 2000

## 2024-05-14 NOTE — PROCEDURE
[FreeTextEntry1] : 03/18/2024 Pulmonary function testing FEV1, FVC, and FEV1/FVC are within normal limits. TLC and subdivisions are normal. RV/TLC ratio is normal. Single breath diffusion capacity is normal.  PFT attached relatively stable function.  1 / 3 Devin Kee  Report date:10/8/2023 View Order (Report matches exam selected in Patient History pane)     ACC: [id number] EXAM: CT ABDOMEN AND PELVIS IC ORDERED BY: YEN MONTAÑO  ACC: [id number] EXAM: CT ANGIO CHEST PULM ART WAWIC ORDERED BY: YEN MONTAÑO  PROCEDURE DATE: 10/08/2023    INTERPRETATION: CLINICAL INFORMATION: Chest pain. Shortness of breath. Abdominal pain.  COMPARISON: None.  CONTRAST/COMPLICATIONS: IV Contrast: IV contrast documented in unlinked concurrent exam (accession [id number]), Omnipaque 350 (accession [id number]) 90 cc administered 10 cc discarded Oral Contrast: NONE Complications: None reported at time of study completion  PROCEDURE: CT Angiography of the Chest was performed followed by portal venous phase imaging of the Abdomen and Pelvis. Sagittal and coronal reformats were performed as well as 3D (MIP) reconstructions.  FINDINGS: CHEST: LUNGS AND LARGE AIRWAYS: Patent central airways. Left lower lobe consolidation. Lungs otherwise clear. PLEURA: No pleural effusion. VESSELS: No pulmonary embolus. No thoracic aortic dissection or aneurysm. HEART: Heart size is normal. No pericardial effusion. MEDIASTINUM AND ZHANE: Moderate subcarinal and bilateral hilar lymphadenopathy. CHEST WALL AND LOWER NECK: Within normal limits.  ABDOMEN AND PELVIS: LIVER: Within normal limits. BILE DUCTS: Normal caliber. GALLBLADDER: Within normal limits. SPLEEN: Within normal limits. PANCREAS: Within normal limits. ADRENALS: Within normal limits. KIDNEYS/URETERS: Within normal limits.  BLADDER: Within normal limits. REPRODUCTIVE ORGANS: Uterus and adnexa within normal limits.  BOWEL: No bowel obstruction. Appendix not identified, no ancillary findings appendicitis. PERITONEUM: No ascites. VESSELS: Within normal limits. RETROPERITONEUM/LYMPH NODES: No lymphadenopathy. ABDOMINAL WALL: Within normal limits. BONES: Within normal limits.  IMPRESSION: No evidence of pulmonary embolism.  Left lower lobe pneumonia.  Moderate subcarinal and bilateral hilar lymphadenopathy presumably reactive/infectious related to the pneumonia. Sarcoidosis, lymphoma less likely but possible.  Recommend follow-up of both of these findings to resolution to exclude malignancy.  No acute findings in the abdomen or pelvis.  --- End of Report ---     ROBERT MILLS MD; Resident Radiologist This document has been electronically signed. DEVIN KEE MD; Attending Radiologist This document has been electronically signed. Oct 8 2023 5:45AM

## 2024-05-14 NOTE — CONSULT LETTER
[Dear  ___] : Dear ~DELVIS, [Consult Letter:] : I had the pleasure of evaluating your patient, [unfilled]. [Consult Closing:] : Thank you very much for allowing me to participate in the care of this patient.  If you have any questions, please do not hesitate to contact me. [DrTnaika  ___] : Dr. CARLISLE [DrTanika ___] : Dr. CARLISLE [FreeTextEntry2] : Daniele Sampson MD [FreeTextEntry3] : [facility]

## 2024-05-14 NOTE — ASSESSMENT
[FreeTextEntry1] : Seeing dermatology. Positive response to Humira.  Skin lesions. Course of antibiotics.  Follow-up Sept. sooner PRN. All discussed with patient. 35-minute spent evaluation management review of multiple studies and discussion.

## 2024-05-21 ENCOUNTER — APPOINTMENT (OUTPATIENT)
Dept: RHEUMATOLOGY | Facility: CLINIC | Age: 49
End: 2024-05-21
Payer: COMMERCIAL

## 2024-05-21 VITALS
DIASTOLIC BLOOD PRESSURE: 75 MMHG | HEIGHT: 64 IN | HEART RATE: 62 BPM | TEMPERATURE: 98.1 F | WEIGHT: 107 LBS | OXYGEN SATURATION: 98 % | BODY MASS INDEX: 18.27 KG/M2 | SYSTOLIC BLOOD PRESSURE: 123 MMHG | RESPIRATION RATE: 16 BRPM

## 2024-05-21 DIAGNOSIS — R76.8 OTHER SPECIFIED ABNORMAL IMMUNOLOGICAL FINDINGS IN SERUM: ICD-10-CM

## 2024-05-21 PROCEDURE — G2211 COMPLEX E/M VISIT ADD ON: CPT

## 2024-05-21 PROCEDURE — 99214 OFFICE O/P EST MOD 30 MIN: CPT

## 2024-05-21 NOTE — REVIEW OF SYSTEMS
[Arthralgias] : arthralgias [As Noted in HPI] : as noted in HPI [Skin Lesions] : skin lesion [Negative] : Heme/Lymph [Joint Pain] : no joint pain [Joint Swelling] : no joint swelling [Joint Stiffness] : no joint stiffness [Limb Pain] : no limb pain [Limb Swelling] : no limb swelling

## 2024-05-21 NOTE — ASSESSMENT
[FreeTextEntry1] : 48 year old female here for evaluation of sarcoidosis  1 H/o hilar adenopathy and skin lesions with biopsy concerning for cutaneous sarcoidosis. Previously had good response to Plaquenil but now feels that the lesions are not responding. She had recent CTA chest, abdomen and pelvis which showed LAD in the chest and LLL pneumonia. No other organ involvement noted on imaging. Started Humira for cutaneous sarcoidosis with significant improvement. Will repeat lung imaging in 6 months to determine whether there has been response to Humira with respect to LAD.   2. 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 Improved on Humira - continue to monitor.    3. Hepatitis B core antibody pos: will send additional studies to determine whether Hep B prophylaxis is needed while on Humira.   Follow up in 3 months.

## 2024-05-21 NOTE — PHYSICAL EXAM
[General Appearance - Alert] : alert [General Appearance - In No Acute Distress] : in no acute distress [Sclera] : the sclera and conjunctiva were normal [PERRL With Normal Accommodation] : pupils were equal in size, round, and reactive to light [Extraocular Movements] : extraocular movements were intact [Outer Ear] : the ears and nose were normal in appearance [Oropharynx] : the oropharynx was normal [Neck Appearance] : the appearance of the neck was normal [Neck Cervical Mass (___cm)] : no neck mass was observed [Jugular Venous Distention Increased] : there was no jugular-venous distention [Thyroid Diffuse Enlargement] : the thyroid was not enlarged [Thyroid Nodule] : there were no palpable thyroid nodules [] : no respiratory distress [Auscultation Breath Sounds / Voice Sounds] : lungs were clear to auscultation bilaterally [Heart Rate And Rhythm] : heart rate was normal and rhythm regular [Heart Sounds] : normal S1 and S2 [Heart Sounds Gallop] : no gallops [Murmurs] : no murmurs [Heart Sounds Pericardial Friction Rub] : no pericardial rub [Full Pulse] : the pedal pulses are present [Edema] : there was no peripheral edema [Cervical Lymph Nodes Enlarged Posterior Bilaterally] : posterior cervical [Cervical Lymph Nodes Enlarged Anterior Bilaterally] : anterior cervical [Supraclavicular Lymph Nodes Enlarged Bilaterally] : supraclavicular [Axillary Lymph Nodes Enlarged Bilaterally] : axillary [No CVA Tenderness] : no ~M costovertebral angle tenderness [No Spinal Tenderness] : no spinal tenderness [No Focal Deficits] : no focal deficits [Oriented To Time, Place, And Person] : oriented to person, place, and time [Impaired Insight] : insight and judgment were intact [Affect] : the affect was normal [Abnormal Walk] : normal gait [Nail Clubbing] : no clubbing  or cyanosis of the fingernails [Musculoskeletal - Swelling] : no joint swelling seen [Motor Tone] : muscle strength and tone were normal [FreeTextEntry1] : scattered maculopapular rashes over the limbs and torso

## 2024-05-21 NOTE — DATA REVIEWED
[FreeTextEntry1] : CTA chest and abdomen (10/2023): CTA Chest and Abdomen (10/2023): CHEST: LUNGS AND LARGE AIRWAYS: Patent central airways. Left lower lobe consolidation. Lungs otherwise clear. PLEURA: No pleural effusion. VESSELS: No pulmonary embolus. No thoracic aortic dissection or aneurysm. HEART: Heart size is normal. No pericardial effusion. MEDIASTINUM AND ZHANE: Moderate subcarinal and bilateral hilar lymphadenopathy. CHEST WALL AND LOWER NECK: Within normal limits. ABDOMEN AND PELVIS: LIVER: Within normal limits. BILE DUCTS: Normal caliber. GALLBLADDER: Within normal limits. SPLEEN: Within normal limits. PANCREAS: Within normal limits. ADRENALS: Within normal limits. KIDNEYS/URETERS: Within normal limits.  BLADDER: Within normal limits.  REPRODUCTIVE ORGANS: Uterus and adnexa within normal limits. BOWEL: No bowel obstruction. Appendix not identified, no ancillary findings appendicitis. PERITONEUM: No ascites. VESSELS: Within normal limits. RETROPERITONEUM/LYMPH NODES: No lymphadenopathy. ABDOMINAL WALL: Within normal limits. BONES: Within normal limits. IMPRESSION: No evidence of pulmonary embolism. Left lower lobe pneumonia. Moderate subcarinal and bilateral hilar lymphadenopathy presumably reactive/infectious related to the pneumonia. Sarcoidosis, lymphoma less likely but possible. Recommend follow-up of both of these findings to resolution to exclude malignancy. No acute findings in the abdomen or pelvis.  prior labs reviewed and scanned into [employer]

## 2024-05-21 NOTE — HISTORY OF PRESENT ILLNESS
[___ Month(s) Ago] : [unfilled] month(s) ago [Currently Experiencing] : currently [Fatigue] : fatigue [Skin Lesions] : skin lesions [Arthralgias] : arthralgias [Dyspnea] : dyspnea [FreeTextEntry1] : Started Humira in April 2024 and has done at least 4 doses thus far with significant improvement in her skin lesions. Skipped last dose due to bronchitis.  [Anorexia] : no anorexia [Weight Loss] : no weight loss [Malaise] : no malaise [Fever] : no fever [Chills] : no chills [Depression] : no depression [Malar Facial Rash] : no malar facial rash [Skin Nodules] : no skin nodules [Oral Ulcers] : no oral ulcers [Cough] : no cough [Dry Mouth] : no dry mouth [Dysphonia] : no dysphonia [Dysphagia] : no dysphagia [Shortness of Breath] : no shortness of breath [Chest Pain] : no chest pain [Joint Swelling] : no joint swelling [Joint Warmth] : no joint warmth [Joint Deformity] : no joint deformity [Decreased ROM] : no decreased range of motion [Morning Stiffness] : no morning stiffness [Falls] : no falls [Difficulty Standing] : no difficulty standing [Difficulty Walking] : no difficulty walking [Myalgias] : no myalgias [Muscle Weakness] : no muscle weakness [Muscle Spasms] : no muscle spasms [Muscle Cramping] : no muscle cramping [Visual Changes] : no visual changes [Eye Pain] : no eye pain [Eye Redness] : no eye redness [Dry Eyes] : no dry eyes

## 2024-05-22 ENCOUNTER — TRANSCRIPTION ENCOUNTER (OUTPATIENT)
Age: 49
End: 2024-05-22

## 2024-05-22 LAB
HBV CORE IGM SER QL: NONREACTIVE
HEPB DNA PCR INT: NOT DETECTED
HEPB DNA PCR LOG: NOT DETECTED LOGIU/ML

## 2024-05-28 ENCOUNTER — APPOINTMENT (OUTPATIENT)
Dept: OBGYN | Facility: CLINIC | Age: 49
End: 2024-05-28
Payer: COMMERCIAL

## 2024-05-28 VITALS
SYSTOLIC BLOOD PRESSURE: 108 MMHG | BODY MASS INDEX: 18.27 KG/M2 | DIASTOLIC BLOOD PRESSURE: 66 MMHG | WEIGHT: 107 LBS | HEIGHT: 64 IN

## 2024-05-28 DIAGNOSIS — N95.1 MENOPAUSAL AND FEMALE CLIMACTERIC STATES: ICD-10-CM

## 2024-05-28 DIAGNOSIS — Z01.419 ENCOUNTER FOR GYNECOLOGICAL EXAMINATION (GENERAL) (ROUTINE) W/OUT ABNORMAL FINDINGS: ICD-10-CM

## 2024-05-28 DIAGNOSIS — N95.2 POSTMENOPAUSAL ATROPHIC VAGINITIS: ICD-10-CM

## 2024-05-28 PROCEDURE — 99396 PREV VISIT EST AGE 40-64: CPT

## 2024-05-28 PROCEDURE — 82270 OCCULT BLOOD FECES: CPT

## 2024-05-28 PROCEDURE — 77080 DXA BONE DENSITY AXIAL: CPT

## 2024-05-28 RX ORDER — ADALIMUMAB 40MG/0.8ML
40 KIT SUBCUTANEOUS
Refills: 0 | Status: ACTIVE | COMMUNITY

## 2024-05-28 RX ORDER — ESTRADIOL 0.1 MG/G
0.1 CREAM VAGINAL
Qty: 1 | Refills: 0 | Status: ACTIVE | COMMUNITY
Start: 2024-05-28 | End: 1900-01-01

## 2024-05-28 RX ORDER — ESTRADIOL 10 UG/1
10 TABLET, FILM COATED VAGINAL
Qty: 3 | Refills: 3 | Status: ACTIVE | COMMUNITY
Start: 2024-05-28 | End: 1900-01-01

## 2024-05-28 NOTE — PLAN
[FreeTextEntry1] : 48 year old female pt presents for routine gyn exam Breast and pelvic exam performed Pap/HPV conducted Advised pt to schedule pelvic sono  Advised pt to schedule DEXA  hot flashes: R/B/A hormonal vs non hormonal options for hot flashes pt states she does not want to use anything at this time.   vaginal dryness: samples of revaree/ hyalogyn given to pt, estradiol tab and cream topically r/b/a d/w pt.  RTO in 1 year or PRN

## 2024-05-28 NOTE — HISTORY OF PRESENT ILLNESS
[Patient reported mammogram was normal] : Patient reported mammogram was normal [Patient reported PAP Smear was normal] : Patient reported PAP Smear was normal [Patient reported colonoscopy was normal] : Patient reported colonoscopy was normal [FreeTextEntry1] : 2024. CHASTITY VALENTINO 48 year old female  LMP PM. She presents for an annual gyn exam.   pt recently started Humira, based on sarcoidosis she has gotten four shots, and her skin is all clear now and feels well.   she reports of having constant urination, pt went to see urologist and has done nothing for the urination and states she also has had hot flashes and was told to f/u w/ GYN.   pt reports of hot flashes which comes and goes, she states that when she takes THC gummies she feels much better and sxs go away.  she also reports of vaginal dryness when she has intercourse.   Denies breakthrough bleeding, vaginal discharge and vaginitis sxs. Denies abdominal or pelvic pain. She has normal BMs. Denies bloody stools. She is currently sexually active.   PHx; Sarcoidosis  SHx: Tonsillectomy  Med: Humaria for sarcoidosis  All: Sulfa  Denies FHx of breast, ovarian, uterine or colon cancer.  No new medical conditions, medications or surgeries. [TextBox_4] : pelvic US 9/11/23- stable pelvic sono   [Mammogramdate] : 1/2024 [BreastSonogramDate] : 1/2024 [PapSmeardate] : 1/2023 [ColonoscopyDate] : 2020 [TextBox_43] : pt is due next year

## 2024-05-28 NOTE — PHYSICAL EXAM
[Chaperone Present] : A chaperone was present in the examining room during all aspects of the physical examination [[id number]] : A chaperone was present during the pelvic exam. [Appropriately responsive] : appropriately responsive [Alert] : alert [No Acute Distress] : no acute distress [No Lymphadenopathy] : no lymphadenopathy [Regular Rate Rhythm] : regular rate rhythm [No Murmurs] : no murmurs [Clear to Auscultation B/L] : clear to auscultation bilaterally [Soft] : soft [Non-tender] : non-tender [Non-distended] : non-distended [No HSM] : No HSM [No Lesions] : no lesions [No Mass] : no mass [Oriented x3] : oriented x3 [Examination Of The Breasts] : a normal appearance [No Masses] : no breast masses were palpable [Vulvar Atrophy] : vulvar atrophy [Labia Majora] : normal [Labia Minora] : normal [Atrophy] : atrophy [Normal] : normal [Uterine Adnexae] : normal [FreeTextEntry2] : Melanie Salazar  [FreeTextEntry9] : no masses. Guaiac negative.

## 2024-05-31 LAB
CYTOLOGY CVX/VAG DOC THIN PREP: ABNORMAL
HPV HIGH+LOW RISK DNA PNL CVX: NOT DETECTED

## 2024-08-14 ENCOUNTER — APPOINTMENT (OUTPATIENT)
Dept: COLORECTAL SURGERY | Facility: CLINIC | Age: 49
End: 2024-08-14
Payer: COMMERCIAL

## 2024-08-14 VITALS
DIASTOLIC BLOOD PRESSURE: 85 MMHG | SYSTOLIC BLOOD PRESSURE: 126 MMHG | OXYGEN SATURATION: 99 % | HEART RATE: 64 BPM | TEMPERATURE: 97.7 F | RESPIRATION RATE: 16 BRPM | HEIGHT: 64 IN | BODY MASS INDEX: 18.27 KG/M2 | WEIGHT: 107 LBS

## 2024-08-14 DIAGNOSIS — Z78.9 OTHER SPECIFIED HEALTH STATUS: ICD-10-CM

## 2024-08-14 DIAGNOSIS — K60.2 ANAL FISSURE, UNSPECIFIED: ICD-10-CM

## 2024-08-14 PROCEDURE — 99204 OFFICE O/P NEW MOD 45 MIN: CPT

## 2024-08-14 RX ORDER — HYDROCORTISONE 25 MG/G
2.5 CREAM TOPICAL
Qty: 1 | Refills: 3 | Status: ACTIVE | COMMUNITY
Start: 2024-08-14 | End: 1900-01-01

## 2024-08-14 NOTE — PHYSICAL EXAM
[Normal Breath Sounds] : Normal breath sounds [Normal Heart Sounds] : normal heart sounds [Normal Rate and Rhythm] : normal rate and rhythm [No Rash or Lesion] : No rash or lesion [Alert] : alert [Oriented to Person] : oriented to person [Oriented to Place] : oriented to place [Oriented to Time] : oriented to time [Calm] : calm [Normal rectal exam] : exam was normal [Posterior] : posteriorly [Abdomen Masses] : No abdominal masses [Abdomen Tenderness] : ~T No ~M abdominal tenderness [Excoriation] : no perianal excoriation [Multiple Sinus Tracts] : no perianal sinus tracts [Fistula] : no fistulas [Wart] : no warts [Ulcer ___ cm] : no ulcers [Pilonidal Cyst] : no pilonidal cysts [Pilonidal Sinus] : no pilonidal sinus [Pilonidal Sinus Draining] : no pilonidal sinus drainage [Tender, Swollen] : nontender, non-swollen [Thrombosed] : that was not thrombosed [Skin Tags] : there were no residual hemorrhoidal skin tags seen [JVD] : no jugular venous distention  [Wheezing] : no wheezing was heard [Purpura] : no purpura  [Petechiae] : no petechiae [Skin Ulcer] : no ulcer [Skin Induration] : no induration [de-identified] : healthy, well nourished [de-identified] : NC/AT [de-identified] : ELIE, steady gait

## 2024-08-14 NOTE — HISTORY OF PRESENT ILLNESS
[FreeTextEntry1] : 47 y/o female with history of hemorrhoids and perianal itching presents today for initial consultation.  She complains that the itching has gotten much worse over the past 3 weeks and worse at night, and she is scratching her skin raw.  She also notes some prolapsed hemorrhoid tissue, but reports that this isn't really bothering her now.    She had a colonoscopy in 2020 by Dr. Soni which was normal.  (Report in [employer]).

## 2024-08-14 NOTE — ASSESSMENT
[FreeTextEntry1] : 48 F w/ posterior midline anal fissure.  Plan:  Nifedipine cream , f/u in 1 month
Alert and oriented to person, place, time/situation. normal mood and affect. no apparent risk to self or others.

## 2024-08-27 ENCOUNTER — APPOINTMENT (OUTPATIENT)
Dept: RHEUMATOLOGY | Facility: CLINIC | Age: 49
End: 2024-08-27
Payer: COMMERCIAL

## 2024-08-27 VITALS
HEART RATE: 64 BPM | WEIGHT: 107 LBS | SYSTOLIC BLOOD PRESSURE: 108 MMHG | OXYGEN SATURATION: 98 % | DIASTOLIC BLOOD PRESSURE: 75 MMHG | BODY MASS INDEX: 18.27 KG/M2 | RESPIRATION RATE: 16 BRPM | HEIGHT: 64 IN

## 2024-08-27 LAB
ALBUMIN SERPL ELPH-MCNC: 4.8 G/DL
ALP BLD-CCNC: 65 U/L
ALT SERPL-CCNC: 21 U/L
ANION GAP SERPL CALC-SCNC: 10 MMOL/L
AST SERPL-CCNC: 23 U/L
BASOPHILS # BLD AUTO: 0.08 K/UL
BASOPHILS NFR BLD AUTO: 1.3 %
BILIRUB SERPL-MCNC: 1.2 MG/DL
BUN SERPL-MCNC: 16 MG/DL
CALCIUM SERPL-MCNC: 10 MG/DL
CHLORIDE SERPL-SCNC: 101 MMOL/L
CO2 SERPL-SCNC: 27 MMOL/L
CREAT SERPL-MCNC: 0.69 MG/DL
CRP SERPL-MCNC: <3 MG/L
EGFR: 105 ML/MIN/1.73M2
EOSINOPHIL # BLD AUTO: 0.08 K/UL
EOSINOPHIL NFR BLD AUTO: 1.3 %
ERYTHROCYTE [SEDIMENTATION RATE] IN BLOOD BY WESTERGREN METHOD: 2 MM/HR
GLUCOSE SERPL-MCNC: 83 MG/DL
HCT VFR BLD CALC: 39.5 %
HGB BLD-MCNC: 13.4 G/DL
IMM GRANULOCYTES NFR BLD AUTO: 0.2 %
LYMPHOCYTES # BLD AUTO: 1.32 K/UL
LYMPHOCYTES NFR BLD AUTO: 22.3 %
MAN DIFF?: NORMAL
MCHC RBC-ENTMCNC: 31.1 PG
MCHC RBC-ENTMCNC: 33.9 GM/DL
MCV RBC AUTO: 91.6 FL
MONOCYTES # BLD AUTO: 0.66 K/UL
MONOCYTES NFR BLD AUTO: 11.1 %
NEUTROPHILS # BLD AUTO: 3.78 K/UL
NEUTROPHILS NFR BLD AUTO: 63.8 %
PLATELET # BLD AUTO: 192 K/UL
POTASSIUM SERPL-SCNC: 4.5 MMOL/L
PROT SERPL-MCNC: 6.8 G/DL
RBC # BLD: 4.31 M/UL
RBC # FLD: 12.4 %
SODIUM SERPL-SCNC: 138 MMOL/L
WBC # FLD AUTO: 5.93 K/UL

## 2024-08-27 PROCEDURE — 99214 OFFICE O/P EST MOD 30 MIN: CPT

## 2024-08-27 PROCEDURE — G2211 COMPLEX E/M VISIT ADD ON: CPT

## 2024-08-27 NOTE — ASSESSMENT
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 Previously had good response to Plaquenil but now feels that the lesions are not responding. She had recent CTA chest, abdomen and pelvis which showed LAD in the chest and LLL pneumonia. No other organ involvement noted on imaging. Started Humira for cutaneous sarcoidosis with significant improvement. Will repeat lung imaging in 6 months (i.e. Oct 2024) to determine whether there has been response to Humira with respect to LAD. Will discuss with Dr. Nelson.   2. 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 Improved on Humira - continue to monitor.    3. Hepatitis B core antibody pos: will send additional studies to determine whether Hep B prophylaxis is needed while on Humira.   Follow up in 3 months.

## 2024-08-27 NOTE — HISTORY OF PRESENT ILLNESS
[___ Month(s) Ago] : [unfilled] month(s) ago [Currently Experiencing] : currently [Fatigue] : fatigue [Skin Lesions] : skin lesions [Arthralgias] : arthralgias [Dyspnea] : dyspnea [FreeTextEntry1] : Doing well - skin lesions have resolved.  [Anorexia] : no anorexia [Weight Loss] : no weight loss [Malaise] : no malaise [Fever] : no fever [Chills] : no chills [Depression] : no depression [Malar Facial Rash] : no malar facial rash [Skin Nodules] : no skin nodules [Oral Ulcers] : no oral ulcers [Cough] : no cough [Dry Mouth] : no dry mouth [Dysphonia] : no dysphonia [Dysphagia] : no dysphagia [Shortness of Breath] : no shortness of breath [Chest Pain] : no chest pain [Joint Swelling] : no joint swelling [Joint Warmth] : no joint warmth [Joint Deformity] : no joint deformity [Decreased ROM] : no decreased range of motion [Morning Stiffness] : no morning stiffness [Falls] : no falls [Difficulty Standing] : no difficulty standing [Difficulty Walking] : no difficulty walking [Myalgias] : no myalgias [Muscle Weakness] : no muscle weakness [Muscle Spasms] : no muscle spasms [Muscle Cramping] : no muscle cramping [Visual Changes] : no visual changes [Eye Pain] : no eye pain [Eye Redness] : no eye redness [Dry Eyes] : no dry eyes

## 2024-08-30 ENCOUNTER — TRANSCRIPTION ENCOUNTER (OUTPATIENT)
Age: 49
End: 2024-08-30

## 2024-08-30 LAB
HEPB DNA PCR INT: NOT DETECTED
HEPB DNA PCR LOG: NOT DETECTED LOGIU/ML

## 2024-09-11 ENCOUNTER — APPOINTMENT (OUTPATIENT)
Dept: COLORECTAL SURGERY | Facility: CLINIC | Age: 49
End: 2024-09-11
Payer: COMMERCIAL

## 2024-09-11 PROCEDURE — 99202 OFFICE O/P NEW SF 15 MIN: CPT | Mod: 25

## 2024-09-11 PROCEDURE — 46600 DIAGNOSTIC ANOSCOPY SPX: CPT

## 2024-09-17 ENCOUNTER — APPOINTMENT (OUTPATIENT)
Dept: PULMONOLOGY | Facility: CLINIC | Age: 49
End: 2024-09-17
Payer: COMMERCIAL

## 2024-09-17 VITALS
DIASTOLIC BLOOD PRESSURE: 78 MMHG | OXYGEN SATURATION: 99 % | RESPIRATION RATE: 16 BRPM | HEART RATE: 67 BPM | SYSTOLIC BLOOD PRESSURE: 114 MMHG

## 2024-09-17 DIAGNOSIS — R59.0 LOCALIZED ENLARGED LYMPH NODES: ICD-10-CM

## 2024-09-17 DIAGNOSIS — R91.8 OTHER NONSPECIFIC ABNORMAL FINDING OF LUNG FIELD: ICD-10-CM

## 2024-09-17 DIAGNOSIS — D86.9 SARCOIDOSIS, UNSPECIFIED: ICD-10-CM

## 2024-09-17 PROCEDURE — 94727 GAS DIL/WSHOT DETER LNG VOL: CPT

## 2024-09-17 PROCEDURE — 99214 OFFICE O/P EST MOD 30 MIN: CPT | Mod: 25

## 2024-09-17 PROCEDURE — 94010 BREATHING CAPACITY TEST: CPT

## 2024-09-17 PROCEDURE — 94729 DIFFUSING CAPACITY: CPT

## 2024-09-17 PROCEDURE — 71046 X-RAY EXAM CHEST 2 VIEWS: CPT

## 2024-09-17 PROCEDURE — ZZZZZ: CPT

## 2024-09-17 NOTE — HISTORY OF PRESENT ILLNESS
[Former] : former [TextBox_4] : On Humira April 15th and has had 3 shots Dr Rosenbach at [facility] a sarcoid dermatologist with positive response. no other chest pains no fever had  ct chest 3/11/24 Was doing well now upset because Humira no approved to continue.  Off Plaquenil Notes some SOB if bends over to fast. Yesterday some FOSTER with .          Positive developed skin lesions 10 years ago.  Had biopsy and came back as sarcoid. Sent for optho and cxr told negative treated with plaquenil for 1 year.  Resolved. 4 years later developed leg lesions which persisted and slow growing told not sarcoid.  After COVID noted increase in skin lesions.  Saw derm and put back on Plaquenil but stopped.      [TextBox_11] : 1/2-1 [TextBox_13] : 15 [YearQuit] : 2000

## 2024-09-17 NOTE — PHYSICAL EXAM
[No Acute Distress] : no acute distress [Normal Oropharynx] : normal oropharynx [Normal Appearance] : normal appearance [No Neck Mass] : no neck mass [Normal Rate/Rhythm] : normal rate/rhythm [Normal S1, S2] : normal s1, s2 [No Murmurs] : no murmurs [No Resp Distress] : no resp distress [Clear to Auscultation Bilaterally] : clear to auscultation bilaterally [Normal to Percussion] : normal to percussion [No Abnormalities] : no abnormalities [Benign] : benign [Normal Gait] : normal gait [No Clubbing] : no clubbing [No Cyanosis] : no cyanosis [No Edema] : no edema [FROM] : FROM [Normal Color/ Pigmentation] : normal color/ pigmentation [No Focal Deficits] : no focal deficits [Oriented x3] : oriented x3 [Normal Affect] : normal affect [No Rash] : no rash

## 2024-09-17 NOTE — CONSULT LETTER
[Dear  ___] : Dear ~DELVIS, [Consult Letter:] : I had the pleasure of evaluating your patient, [unfilled]. [Consult Closing:] : Thank you very much for allowing me to participate in the care of this patient.  If you have any questions, please do not hesitate to contact me. [DrTanika  ___] : Dr. CARLISLE [DrTanika ___] : Dr. CARLISLE [FreeTextEntry2] : Daniele Sampson MD [FreeTextEntry3] : [facility]

## 2024-09-17 NOTE — ASSESSMENT
[FreeTextEntry1] : [facility] Flu and COVID vaccine.  On Humira. [facility]. continue. F/U 6 months sooner PRN Consider follow-up CT in March 2025.    35-minute spent evaluation management review of multiple studies and discussion.

## 2024-11-08 ENCOUNTER — APPOINTMENT (OUTPATIENT)
Dept: OBGYN | Facility: CLINIC | Age: 49
End: 2024-11-08

## 2024-11-08 ENCOUNTER — ASOB RESULT (OUTPATIENT)
Age: 49
End: 2024-11-08

## 2024-11-08 PROCEDURE — 76830 TRANSVAGINAL US NON-OB: CPT

## 2024-12-13 ENCOUNTER — APPOINTMENT (OUTPATIENT)
Dept: PULMONOLOGY | Facility: CLINIC | Age: 49
End: 2024-12-13

## 2024-12-24 PROBLEM — F10.90 ALCOHOL USE: Status: ACTIVE | Noted: 2020-01-08

## 2025-01-13 ENCOUNTER — ASOB RESULT (OUTPATIENT)
Age: 50
End: 2025-01-13

## 2025-01-13 ENCOUNTER — APPOINTMENT (OUTPATIENT)
Dept: OBGYN | Facility: CLINIC | Age: 50
End: 2025-01-13
Payer: COMMERCIAL

## 2025-01-13 PROCEDURE — 76830 TRANSVAGINAL US NON-OB: CPT

## 2025-01-16 ENCOUNTER — RESULT REVIEW (OUTPATIENT)
Age: 50
End: 2025-01-16

## 2025-01-16 ENCOUNTER — APPOINTMENT (OUTPATIENT)
Dept: ULTRASOUND IMAGING | Facility: CLINIC | Age: 50
End: 2025-01-16
Payer: COMMERCIAL

## 2025-01-16 ENCOUNTER — APPOINTMENT (OUTPATIENT)
Dept: MAMMOGRAPHY | Facility: CLINIC | Age: 50
End: 2025-01-16
Payer: COMMERCIAL

## 2025-01-16 PROCEDURE — 76641 ULTRASOUND BREAST COMPLETE: CPT | Mod: 50

## 2025-01-16 PROCEDURE — 77067 SCR MAMMO BI INCL CAD: CPT

## 2025-01-16 PROCEDURE — 77063 BREAST TOMOSYNTHESIS BI: CPT

## 2025-02-03 ENCOUNTER — APPOINTMENT (OUTPATIENT)
Dept: COLORECTAL SURGERY | Facility: CLINIC | Age: 50
End: 2025-02-03
Payer: COMMERCIAL

## 2025-02-03 PROCEDURE — 45378 DIAGNOSTIC COLONOSCOPY: CPT

## 2025-03-17 ENCOUNTER — APPOINTMENT (OUTPATIENT)
Dept: PULMONOLOGY | Facility: CLINIC | Age: 50
End: 2025-03-17
Payer: COMMERCIAL

## 2025-03-17 VITALS
OXYGEN SATURATION: 100 % | SYSTOLIC BLOOD PRESSURE: 125 MMHG | HEART RATE: 64 BPM | BODY MASS INDEX: 18.37 KG/M2 | WEIGHT: 107 LBS | DIASTOLIC BLOOD PRESSURE: 72 MMHG

## 2025-03-17 DIAGNOSIS — D86.9 SARCOIDOSIS, UNSPECIFIED: ICD-10-CM

## 2025-03-17 DIAGNOSIS — R59.0 LOCALIZED ENLARGED LYMPH NODES: ICD-10-CM

## 2025-03-17 DIAGNOSIS — R91.8 OTHER NONSPECIFIC ABNORMAL FINDING OF LUNG FIELD: ICD-10-CM

## 2025-03-17 PROCEDURE — 99214 OFFICE O/P EST MOD 30 MIN: CPT

## 2025-03-17 PROCEDURE — 71046 X-RAY EXAM CHEST 2 VIEWS: CPT

## 2025-03-24 NOTE — HISTORY OF PRESENT ILLNESS
[FreeTextEntry1] : Pleasant 49-year-old female who is concerned about some perianal itching.  Approximately 16 years ago after the delivery of her third child, she developed a significant perianal abscess which required drainage and eventual fistulotomy.  I last performed a colonoscopy on her approximately 4 years ago.  Recently the patient developed severe perianal itching and was treated with Lotrisone cream and Mycostatin powder.  She is very nervous about the symptoms but states that they are improved.  She denies significant rectal bleeding.  She does state that she does have some fecal urgency but no accidents.  Inspection of the anorectal area reveals some scarring in the posterior midline consistent with her fistulotomy site.  There are some small anal tags present.  On digital examination there is some deformity of the posterior midline related to the fistulotomy.  Anoscopy confirms this.  I reassured the patient that I see no significant anorectal pathology.  I have advised her to minimize her caffeinated product intake.  I have also asked her to use Balneol for anal cleansing and soothing.  She will schedule repeat colonoscopy as her last was 4 to 5 years ago
None

## 2025-04-11 ENCOUNTER — NON-APPOINTMENT (OUTPATIENT)
Age: 50
End: 2025-04-11

## 2025-06-09 ENCOUNTER — NON-APPOINTMENT (OUTPATIENT)
Age: 50
End: 2025-06-09

## 2025-06-09 ENCOUNTER — APPOINTMENT (OUTPATIENT)
Dept: OBGYN | Facility: CLINIC | Age: 50
End: 2025-06-09
Payer: COMMERCIAL

## 2025-06-09 VITALS
HEIGHT: 64 IN | SYSTOLIC BLOOD PRESSURE: 119 MMHG | BODY MASS INDEX: 18.1 KG/M2 | DIASTOLIC BLOOD PRESSURE: 85 MMHG | WEIGHT: 106 LBS

## 2025-06-09 PROBLEM — Z13.31 DEPRESSION SCREEN: Status: ACTIVE | Noted: 2025-06-09

## 2025-06-09 PROCEDURE — 99396 PREV VISIT EST AGE 40-64: CPT

## 2025-06-09 PROCEDURE — 82270 OCCULT BLOOD FECES: CPT

## 2025-06-09 PROCEDURE — 36415 COLL VENOUS BLD VENIPUNCTURE: CPT

## 2025-06-09 PROCEDURE — G0444 DEPRESSION SCREEN ANNUAL: CPT | Mod: 59

## 2025-06-09 PROCEDURE — 99459 PELVIC EXAMINATION: CPT | Mod: NC

## 2025-06-09 RX ORDER — PROGESTERONE 100 MG/1
100 CAPSULE ORAL
Qty: 90 | Refills: 3 | Status: ACTIVE | COMMUNITY
Start: 2025-06-09 | End: 1900-01-01

## 2025-06-09 RX ORDER — ESTRADIOL 0.03 MG/D
0.03 PATCH, EXTENDED RELEASE TRANSDERMAL
Qty: 24 | Refills: 3 | Status: ACTIVE | COMMUNITY
Start: 2025-06-09 | End: 1900-01-01

## 2025-06-10 ENCOUNTER — APPOINTMENT (OUTPATIENT)
Dept: OBGYN | Facility: CLINIC | Age: 50
End: 2025-06-10

## 2025-06-10 LAB
ESTRADIOL SERPL-MCNC: 7 PG/ML
FSH SERPL-MCNC: >200 IU/L
LH SERPL-ACNC: 71.4 IU/L
T4 FREE SERPL-MCNC: 1.4 NG/DL
TSH SERPL-ACNC: 2.25 UIU/ML

## 2025-06-11 LAB — HPV HIGH+LOW RISK DNA PNL CVX: NOT DETECTED

## 2025-06-13 LAB — CYTOLOGY CVX/VAG DOC THIN PREP: NORMAL

## 2025-06-26 ENCOUNTER — NON-APPOINTMENT (OUTPATIENT)
Age: 50
End: 2025-06-26

## 2025-08-12 ENCOUNTER — APPOINTMENT (OUTPATIENT)
Dept: OBGYN | Facility: CLINIC | Age: 50
End: 2025-08-12

## 2025-08-12 ENCOUNTER — ASOB RESULT (OUTPATIENT)
Age: 50
End: 2025-08-12

## 2025-08-12 PROCEDURE — 76830 TRANSVAGINAL US NON-OB: CPT

## 2025-09-15 ENCOUNTER — APPOINTMENT (OUTPATIENT)
Dept: PULMONOLOGY | Facility: CLINIC | Age: 50
End: 2025-09-15
Payer: COMMERCIAL

## 2025-09-15 VITALS — SYSTOLIC BLOOD PRESSURE: 105 MMHG | HEART RATE: 70 BPM | OXYGEN SATURATION: 99 % | DIASTOLIC BLOOD PRESSURE: 68 MMHG

## 2025-09-15 DIAGNOSIS — L92.9 GRANULOMATOUS DISORDER OF THE SKIN AND SUBCUTANEOUS TISSUE, UNSPECIFIED: ICD-10-CM

## 2025-09-15 DIAGNOSIS — D86.9 SARCOIDOSIS, UNSPECIFIED: ICD-10-CM

## 2025-09-15 DIAGNOSIS — R91.8 OTHER NONSPECIFIC ABNORMAL FINDING OF LUNG FIELD: ICD-10-CM

## 2025-09-15 LAB — POCT - HEMOGLOBIN (HGB), QUANTITATIVE, TRANSCUTANEOUS: 12.7

## 2025-09-15 PROCEDURE — 94729 DIFFUSING CAPACITY: CPT

## 2025-09-15 PROCEDURE — 99214 OFFICE O/P EST MOD 30 MIN: CPT | Mod: 25

## 2025-09-15 PROCEDURE — 94010 BREATHING CAPACITY TEST: CPT

## 2025-09-15 PROCEDURE — 88738 HGB QUANT TRANSCUTANEOUS: CPT

## 2025-09-15 PROCEDURE — ZZZZZ: CPT

## 2025-09-15 PROCEDURE — 94727 GAS DIL/WSHOT DETER LNG VOL: CPT

## 2025-09-17 ENCOUNTER — RX RENEWAL (OUTPATIENT)
Age: 50
End: 2025-09-17